# Patient Record
Sex: MALE | Race: WHITE | Employment: FULL TIME | URBAN - METROPOLITAN AREA
[De-identification: names, ages, dates, MRNs, and addresses within clinical notes are randomized per-mention and may not be internally consistent; named-entity substitution may affect disease eponyms.]

---

## 2017-09-15 ENCOUNTER — OFFICE VISIT (OUTPATIENT)
Dept: FAMILY MEDICINE | Facility: CLINIC | Age: 47
End: 2017-09-15

## 2017-09-15 ENCOUNTER — TELEPHONE (OUTPATIENT)
Dept: FAMILY MEDICINE | Facility: CLINIC | Age: 47
End: 2017-09-15

## 2017-09-15 VITALS
SYSTOLIC BLOOD PRESSURE: 136 MMHG | BODY MASS INDEX: 29.98 KG/M2 | WEIGHT: 202.4 LBS | HEIGHT: 69 IN | HEART RATE: 72 BPM | RESPIRATION RATE: 20 BRPM | TEMPERATURE: 98 F | DIASTOLIC BLOOD PRESSURE: 82 MMHG

## 2017-09-15 DIAGNOSIS — N52.9 ERECTILE DYSFUNCTION, UNSPECIFIED ERECTILE DYSFUNCTION TYPE: ICD-10-CM

## 2017-09-15 PROCEDURE — 99213 OFFICE O/P EST LOW 20 MIN: CPT | Performed by: PHYSICIAN ASSISTANT

## 2017-09-15 RX ORDER — SILDENAFIL 100 MG/1
50-100 TABLET, FILM COATED ORAL DAILY PRN
Qty: 12 TABLET | Refills: 11 | Status: CANCELLED | OUTPATIENT
Start: 2017-09-15

## 2017-09-15 RX ORDER — TADALAFIL 20 MG/1
20 TABLET ORAL DAILY PRN
Qty: 12 TABLET | Refills: 3 | Status: SHIPPED | OUTPATIENT
Start: 2017-09-15

## 2017-09-15 RX ORDER — VARDENAFIL HYDROCHLORIDE 20 MG/1
TABLET ORAL
Qty: 30 TABLET | Refills: 3 | Status: SHIPPED | OUTPATIENT
Start: 2017-09-15 | End: 2017-09-16

## 2017-09-15 NOTE — TELEPHONE ENCOUNTER
Simeon Toussaint is requesting a refill of:    Pending Prescriptions:                       Disp   Refills    vardenafil (LEVITRA) 20 MG tablet         24 tab*1            Sig: Take 1 tablet by mouth. Take as needed    Need to mail to his house. Please call him and see if that is ok.  [facility]  [phone] (home)

## 2017-09-15 NOTE — PROGRESS NOTES
"CC: Medication Refill    History:  Is here today for Levitra refill. Levitra 20 mg daily works well for him. Tried lower doses, without as good of effects. Has not tried any other medication for this. No side effects to medication. Pays almost $2000 for 30 tablets.      PMH, MEDICATIONS, ALLERGIES, SOCIAL AND FAMILY HISTORY in [facility] and reviewed by me personally.      ROS negative other than the symptoms noted above in the HPI.        Examination   /82 (BP Location: Right arm, Patient Position: Chair, Cuff Size: Adult Regular)  Pulse 72  Temp 98  F (36.7  C) (Oral)  Resp 20  Ht 1.753 m (5' 9\")  Wt 91.8 kg (202 lb 6.4 oz)  BMI 29.89 kg/m2       Constitutional: Sitting comfortably, in no acute distress. Vital signs noted  Cardiovascular:  regular rate and rhythm, no murmurs, clicks, or gallops  Respiratory:  normal respiratory rate and rhythm, lungs clear to auscultation  SKIN: No jaundice/pallor/rash.   Psychiatric: mentation appears normal and affect normal/bright        A/P    ICD-10-CM    1. Erectile dysfunction, unspecified erectile dysfunction type N52.9 vardenafil (LEVITRA) 20 MG tablet     tadalafil (CIALIS) 20 MG tablet       DISCUSSION: Discussed options for ED treatment with Ben. Will refill Levitra as this has worked for him, but also encouraged him to try trial of Cialis as it appears that may be a cheaper option. Discussed side effects and how to best take medication. He will contact me with any questions or concerns.    follow up visit: As needed    Nuris Funez PA-C  [facility]    "

## 2017-09-15 NOTE — NURSING NOTE
Pt called the clinic stating that one of his prescriptions never made it over to the Pharmacy.   Called over the second prescription the Levitra with the refills.     Lizz.TOM Gonzales ([facility])

## 2017-09-15 NOTE — MR AVS SNAPSHOT
After Visit Summary   9/15/2017    Simeon Toussaint    MRN: [id number]           Patient Information     Date Of Birth          1970        Visit Information        Provider Department      9/15/2017 9:30 AM Nuris Funez PA-C [facility]        Today's Diagnoses     Erectile dysfunction, unspecified erectile dysfunction type           Follow-ups after your visit        Follow-up notes from your care team     Return in about 1 year (around 9/15/2018) for Routine Visit.      Who to contact     If you have questions or need follow up information about today's clinic visit or your schedule please contact [facility] directly at [phone].  Normal or non-critical lab and imaging results will be communicated to you by [employer], letter or phone within 4 business days after the clinic has received the results. If you do not hear from us within 7 days, please contact the clinic through [employer] or phone. If you have a critical or abnormal lab result, we will notify you by phone as soon as possible.  Submit refill requests through [employer] or call your pharmacy and they will forward the refill request to us. Please allow 3 business days for your refill to be completed.          Additional Information About Your Visit        MyChart Information     [employer] gives you secure access to your electronic health record. If you see a primary care provider, you can also send messages to your care team and make appointments. If you have questions, please call your primary care clinic.  If you do not have a primary care provider, please call [phone] and they will assist you.        Care EveryWhere ID     This is your Care EveryWhere ID. This could be used by other organizations to access your [facility] records  [id number]        Your Vitals Were     Pulse Temperature Respirations Height BMI (Body Mass Index)       72 98  F (36.7  C) (Oral) 20  "1.753 m (5' 9\") 29.89 kg/m2        Blood Pressure from Last 3 Encounters:   09/15/17 136/82   10/16/15 114/74   04/24/15 138/72    Weight from Last 3 Encounters:   09/15/17 91.8 kg (202 lb 6.4 oz)   10/16/15 90.3 kg (199 lb)   04/24/15 89.2 kg (196 lb 9.6 oz)              Today, you had the following     No orders found for display         Today's Medication Changes          These changes are accurate as of: 9/15/17  4:29 PM.  If you have any questions, ask your nurse or doctor.               Start taking these medicines.        Dose/Directions    tadalafil 20 MG tablet   Commonly known as:  CIALIS   Used for:  Erectile dysfunction, unspecified erectile dysfunction type   Started by:  Nuris Funez PA-C        Dose:  20 mg   Take 1 tablet (20 mg) by mouth daily as needed Never use with nitroglycerin, terazosin or doxazosin.   Quantity:  12 tablet   Refills:  3            Where to get your medicines      These medications were sent to [facility] [id number] [place] [address] - [address] AT [facility]  [address] [facility], [place] [address]     Phone:  [phone]     tadalafil 20 MG tablet    vardenafil 20 MG tablet                Primary Care Provider Office Phone # Fax #    Mark Mo [phone] [address] [address]        Equal Access to Services     GAVINO TERRAZAS AH: Hadii aad ku hadasho Soomaali, waaxda luqadaha, qaybta kaalmada adeegyada, waxay mel parikh. So [facility] [phone].    ATENCIÓN: Si habla español, tiene a morrow disposición servicios gratuitos de asistencia lingüística. Llame al [phone].    We comply with applicable federal civil rights laws and [place] laws. We do not discriminate on the basis of race, color, national origin, age, disability sex, sexual orientation or gender identity.            Thank you!     Thank you for choosing [facility]  for your care. Our goal " is always to provide you with excellent care. Hearing back from our patients is one way we can continue to improve our services. Please take a few minutes to complete the written survey that you may receive in the mail after your visit with us. Thank you!             Your Updated Medication List - Protect others around you: Learn how to safely use, store and throw away your medicines at www.disposemymeds.org.          This list is accurate as of: 9/15/17  4:29 PM.  Always use your most recent med list.                   Brand Name Dispense Instructions for use Diagnosis    tadalafil 20 MG tablet    CIALIS    12 tablet    Take 1 tablet (20 mg) by mouth daily as needed Never use with nitroglycerin, terazosin or doxazosin.    Erectile dysfunction, unspecified erectile dysfunction type       vardenafil 20 MG tablet    LEVITRA    30 tablet    Take 1 tablet by mouth. Take as needed    Erectile dysfunction, unspecified erectile dysfunction type

## 2017-09-15 NOTE — NURSING NOTE
Simeon Toussaint is here for a medication check and refill.  Questioned patient about current smoking habits.  Pt. has never smoked.  PULSE regular  My Chart: active  CLASSIFICATION OF OVERWEIGHT AND OBESITY BY BMI                        Obesity Class           BMI(kg/m2)  Underweight                                    < 18.5  Normal                                         18.5-24.9  Overweight                                     25.0-29.9  OBESITY                     I                  30.0-34.9                             II                 35.0-39.9  EXTREME OBESITY             III                >40                            Patient's  BMI Body mass index is 29.89 kg/(m^2).  http://hin.nhlbi.nih.gov/menuplanner/menu.cgi  Pre-visit planning  Immunizations - up to date

## 2017-09-16 RX ORDER — VARDENAFIL HYDROCHLORIDE 20 MG/1
20 TABLET ORAL DAILY PRN
Qty: 24 TABLET | Refills: 3 | Status: SHIPPED | OUTPATIENT
Start: 2017-09-16 | End: 2017-09-16

## 2017-09-16 RX ORDER — VARDENAFIL HYDROCHLORIDE 20 MG/1
20 TABLET ORAL DAILY PRN
Qty: 24 TABLET | Refills: 3 | Status: SHIPPED | OUTPATIENT
Start: 2017-09-16

## 2017-09-16 RX ORDER — VARDENAFIL HYDROCHLORIDE 20 MG/1
TABLET ORAL
Qty: 24 TABLET | Refills: 1 | Status: CANCELLED | OUTPATIENT
Start: 2017-09-16

## 2017-09-16 NOTE — TELEPHONE ENCOUNTER
I have attempted to call the Pt at the number that was left here in his chart, but a woman answers and states that there is no Christopher here.   I have called twice to make sure I got the number correct.     I called on his cell phone and left a message and asked him to call me back at the clinic and let  Me know if he wanted me to mail his prescription to his house.     Lizz.TOM Gonzales ([facility])

## 2017-09-16 NOTE — TELEPHONE ENCOUNTER
Per Sakshi's note, please confirm this is okay with pt, and then send rx via mail to him. Thanks.

## 2017-09-16 NOTE — TELEPHONE ENCOUNTER
Simeon Called back and he does want the prescription mailed.   Placed in an envelope to be mailed.     Lizz.TOM Gonzales ([facility])

## 2018-01-29 ENCOUNTER — E-VISIT (OUTPATIENT)
Dept: FAMILY MEDICINE | Facility: CLINIC | Age: 48
End: 2018-01-29

## 2018-01-29 DIAGNOSIS — G47.00 INSOMNIA, UNSPECIFIED TYPE: Primary | ICD-10-CM

## 2019-01-03 ENCOUNTER — OFFICE VISIT (OUTPATIENT)
Dept: FAMILY MEDICINE | Facility: CLINIC | Age: 49
End: 2019-01-03

## 2019-01-03 VITALS
HEART RATE: 61 BPM | BODY MASS INDEX: 29.83 KG/M2 | TEMPERATURE: 98.4 F | SYSTOLIC BLOOD PRESSURE: 130 MMHG | HEIGHT: 68 IN | OXYGEN SATURATION: 98 % | WEIGHT: 196.8 LBS | DIASTOLIC BLOOD PRESSURE: 78 MMHG

## 2019-01-03 DIAGNOSIS — Z71.84 ENCOUNTER FOR COUNSELING FOR TRAVEL: Primary | ICD-10-CM

## 2019-01-03 DIAGNOSIS — A09 TRAVELER'S DIARRHEA: ICD-10-CM

## 2019-01-03 DIAGNOSIS — G47.00 INSOMNIA, UNSPECIFIED TYPE: ICD-10-CM

## 2019-01-03 DIAGNOSIS — Z23 NEED FOR IMMUNIZATION AGAINST TYPHOID: ICD-10-CM

## 2019-01-03 DIAGNOSIS — Z29.89 NEED FOR MALARIA PROPHYLAXIS: ICD-10-CM

## 2019-01-03 PROCEDURE — 99213 OFFICE O/P EST LOW 20 MIN: CPT | Performed by: PHYSICIAN ASSISTANT

## 2019-01-03 RX ORDER — ZOLPIDEM TARTRATE 10 MG/1
TABLET ORAL
Qty: 4 TABLET | Refills: 0 | Status: SHIPPED | OUTPATIENT
Start: 2019-01-03

## 2019-01-03 RX ORDER — LOPERAMIDE HYDROCHLORIDE 2 MG/1
2 TABLET ORAL 4 TIMES DAILY PRN
Qty: 30 TABLET | Refills: 0 | Status: SHIPPED | OUTPATIENT
Start: 2019-01-03 | End: 2019-01-13

## 2019-01-03 RX ORDER — ATOVAQUONE AND PROGUANIL HYDROCHLORIDE 250; 100 MG/1; MG/1
1 TABLET, FILM COATED ORAL DAILY
Qty: 14 TABLET | Refills: 0 | Status: SHIPPED | OUTPATIENT
Start: 2019-01-03 | End: 2019-01-17

## 2019-01-03 RX ORDER — CIPROFLOXACIN 500 MG/1
500 TABLET, FILM COATED ORAL 2 TIMES DAILY
Qty: 6 TABLET | Refills: 0 | Status: SHIPPED | OUTPATIENT
Start: 2019-01-03 | End: 2019-01-06

## 2019-01-03 ASSESSMENT — MIFFLIN-ST. JEOR: SCORE: 1731.15

## 2019-01-03 NOTE — PROGRESS NOTES
"CC: Travel consult- [place]    History:  Simeon is here today requesting several medication to enable his trip to [place].     Traveling to [place] 1/14/2019, arrives on 1/15/2019, and returns 1/18/2019.     Has been told by his company he will need typhoid vaccination, malaria prophylaxis, and traveler's diarrhea medication just in case.     Simeon also feels like he would benefit from a small quantity of Ambien to take on the flight. Has taken Ambien 10 mg before a flight and was able to sleep when he generally can't sleep on planes.     Overall, Simeon is feeling well, and feels fit to travel.     PMH, MEDICATIONS, ALLERGIES, SOCIAL AND FAMILY HISTORY in [facility] and reviewed by me personally.      ROS negative other than the symptoms noted above in the HPI.        Examination   /78 (BP Location: Right arm, Patient Position: Sitting, Cuff Size: Adult Large)   Pulse 61   Temp 98.4  F (36.9  C) (Oral)   Ht 1.734 m (5' 8.25\")   Wt 89.3 kg (196 lb 12.8 oz)   SpO2 98%   BMI 29.70 kg/m         Constitutional: Sitting comfortably, in no acute distress. Vital signs noted  Neck:  no adenopathy, trachea midline and normal to palpation  Cardiovascular:  regular rate and rhythm, no murmurs, clicks, or gallops  Respiratory:  normal respiratory rate and rhythm, lungs clear to auscultation  Psychiatric: mentation appears normal and affect normal/bright        A/P    ICD-10-CM    1. Encounter for counseling for travel Z71.89 loperamide (IMODIUM A-D) 2 MG tablet     zolpidem (AMBIEN) 10 MG tablet     typhoid (VIVOTIF) CR capsule     ciprofloxacin (CIPRO) 500 MG tablet     atovaquone-proguanil (MALARONE) 250-100 MG tablet   2. Need for malaria prophylaxis Z29.8 atovaquone-proguanil (MALARONE) 250-100 MG tablet   3. Need for immunization against typhoid Z23 typhoid (VIVOTIF) CR capsule   4. Traveler's diarrhea A09 loperamide (IMODIUM A-D) 2 MG tablet     ciprofloxacin (CIPRO) 500 MG tablet   5. Insomnia, " "unspecified type G47.00 zolpidem (AMBIEN) 10 MG tablet       DISCUSSION:  Reviewed CDC recommendations for travel to [place]. Explained that cholera vaccination is not available here. Would like him to complete Typhoid vaccination. Will also prescribe Malarone for him to take for malaria prophylaxis. Instructed on use, and possible side effects.     For PRN medications, did write script for 3 day course of ciprofloxacin for traveler's diarrhea, as well as prescription strength imodium. He has taken ciprofloxacin in the past without difficulty, but warned of side effects, including risk of tendon injury. Take with food.     Finally, did prescribe 4 tablets of Ambien 10 mg for him to use when flying. Warned him of both short and long term side effects, but pt is willing to accept risk. No driving while taking. Did briefly discuss a \"benzo\" as an alternative, but pt has never tried this for this reason before so he is unsure if they would work, and afraid to risk them not working.     follow up visit: As needed    Nuris Funez PA-C  [facility]    "

## 2019-01-03 NOTE — NURSING NOTE
Simeon is here today to discuss his upcoming trip to [facility] and any meds he may need.    Pre-visit Screening:  Immunizations:  up to date  Colonoscopy:  is not up to date  Mammogram: CALEB  Asthma Action Test/Plan:  CALEB  PHQ9:  PHQ 2 done today  GAD7:  NA  Questioned patient about current smoking habits Pt. has never smoked.  Ok to leave detailed message on voice mail for today's visit only Yes, phone # [phone]

## 2019-12-15 ENCOUNTER — HEALTH MAINTENANCE LETTER (OUTPATIENT)
Age: 49
End: 2019-12-15

## 2020-03-22 ENCOUNTER — HEALTH MAINTENANCE LETTER (OUTPATIENT)
Age: 50
End: 2020-03-22

## 2021-01-15 ENCOUNTER — HEALTH MAINTENANCE LETTER (OUTPATIENT)
Age: 51
End: 2021-01-15

## 2021-10-24 ENCOUNTER — HEALTH MAINTENANCE LETTER (OUTPATIENT)
Age: 51
End: 2021-10-24

## 2022-02-13 ENCOUNTER — HEALTH MAINTENANCE LETTER (OUTPATIENT)
Age: 52
End: 2022-02-13

## 2022-10-16 ENCOUNTER — HEALTH MAINTENANCE LETTER (OUTPATIENT)
Age: 52
End: 2022-10-16

## 2023-03-26 ENCOUNTER — HEALTH MAINTENANCE LETTER (OUTPATIENT)
Age: 53
End: 2023-03-26

## 2025-06-05 ENCOUNTER — NURSE TRIAGE (OUTPATIENT)
Dept: FAMILY MEDICINE | Facility: CLINIC | Age: 55
End: 2025-06-05

## 2025-06-05 ENCOUNTER — HOSPITAL ENCOUNTER (EMERGENCY)
Facility: CLINIC | Age: 55
Discharge: HOME | End: 2025-06-05
Attending: EMERGENCY MEDICINE
Payer: COMMERCIAL

## 2025-06-05 VITALS
HEIGHT: 69 IN | TEMPERATURE: 97 F | HEART RATE: 60 BPM | SYSTOLIC BLOOD PRESSURE: 161 MMHG | RESPIRATION RATE: 18 BRPM | WEIGHT: 207.67 LBS | OXYGEN SATURATION: 96 % | BODY MASS INDEX: 30.76 KG/M2 | DIASTOLIC BLOOD PRESSURE: 101 MMHG

## 2025-06-05 DIAGNOSIS — F10.129 ALCOHOL ABUSE WITH INTOXICATION: ICD-10-CM

## 2025-06-05 DIAGNOSIS — I10 HYPERTENSION, UNSPECIFIED TYPE: ICD-10-CM

## 2025-06-05 LAB
ALBUMIN SERPL BCG-MCNC: 4.6 G/DL (ref 3.5–5.2)
ALP SERPL-CCNC: 79 U/L (ref 40–150)
ALT SERPL W P-5'-P-CCNC: 109 U/L (ref 0–70)
ANION GAP SERPL CALCULATED.3IONS-SCNC: 17 MMOL/L (ref 7–15)
AST SERPL W P-5'-P-CCNC: 126 U/L (ref 0–45)
ATRIAL RATE - MUSE: 65 BPM
BASOPHILS # BLD AUTO: 0.1 10E3/UL (ref 0–0.2)
BASOPHILS NFR BLD AUTO: 2 %
BILIRUB SERPL-MCNC: 0.9 MG/DL
BUN SERPL-MCNC: 4.6 MG/DL (ref 6–20)
CALCIUM SERPL-MCNC: 9 MG/DL (ref 8.8–10.4)
CHLORIDE SERPL-SCNC: 89 MMOL/L (ref 98–107)
CREAT SERPL-MCNC: 0.75 MG/DL (ref 0.67–1.17)
DIASTOLIC BLOOD PRESSURE - MUSE: NORMAL MMHG
EGFRCR SERPLBLD CKD-EPI 2021: >90 ML/MIN/1.73M2
EOSINOPHIL # BLD AUTO: 0.1 10E3/UL (ref 0–0.7)
EOSINOPHIL NFR BLD AUTO: 4 %
ERYTHROCYTE [DISTWIDTH] IN BLOOD BY AUTOMATED COUNT: 14.3 % (ref 10–15)
ETHANOL SERPL-MCNC: 0.25 G/DL
GLUCOSE SERPL-MCNC: 97 MG/DL (ref 70–99)
HCO3 SERPL-SCNC: 24 MMOL/L (ref 22–29)
HCT VFR BLD AUTO: 45.2 % (ref 40–53)
HGB BLD-MCNC: 16.6 G/DL (ref 13.3–17.7)
HOLD SPECIMEN: NORMAL
HOLD SPECIMEN: NORMAL
IMM GRANULOCYTES # BLD: 0 10E3/UL
IMM GRANULOCYTES NFR BLD: 0 %
INTERPRETATION ECG - MUSE: NORMAL
LIPASE SERPL-CCNC: 49 U/L (ref 13–60)
LYMPHOCYTES # BLD AUTO: 0.9 10E3/UL (ref 0.8–5.3)
LYMPHOCYTES NFR BLD AUTO: 27 %
MAGNESIUM SERPL-MCNC: 1.8 MG/DL (ref 1.7–2.3)
MCH RBC QN AUTO: 32.7 PG (ref 26.5–33)
MCHC RBC AUTO-ENTMCNC: 36.7 G/DL (ref 31.5–36.5)
MCV RBC AUTO: 89 FL (ref 78–100)
MONOCYTES # BLD AUTO: 0.6 10E3/UL (ref 0–1.3)
MONOCYTES NFR BLD AUTO: 17 %
NEUTROPHILS # BLD AUTO: 1.8 10E3/UL (ref 1.6–8.3)
NEUTROPHILS NFR BLD AUTO: 50 %
NRBC # BLD AUTO: 0 10E3/UL
NRBC BLD AUTO-RTO: 0 /100
P AXIS - MUSE: 51 DEGREES
PLATELET # BLD AUTO: 75 10E3/UL (ref 150–450)
POTASSIUM SERPL-SCNC: 4 MMOL/L (ref 3.4–5.3)
PR INTERVAL - MUSE: 216 MS
PROT SERPL-MCNC: 7.8 G/DL (ref 6.4–8.3)
QRS DURATION - MUSE: 88 MS
QT - MUSE: 414 MS
QTC - MUSE: 430 MS
R AXIS - MUSE: 21 DEGREES
RBC # BLD AUTO: 5.07 10E6/UL (ref 4.4–5.9)
SODIUM SERPL-SCNC: 130 MMOL/L (ref 135–145)
SYSTOLIC BLOOD PRESSURE - MUSE: NORMAL MMHG
T AXIS - MUSE: 84 DEGREES
VENTRICULAR RATE- MUSE: 65 BPM
WBC # BLD AUTO: 3.5 10E3/UL (ref 4–11)

## 2025-06-05 PROCEDURE — 82077 ASSAY SPEC XCP UR&BREATH IA: CPT | Performed by: EMERGENCY MEDICINE

## 2025-06-05 PROCEDURE — 96361 HYDRATE IV INFUSION ADD-ON: CPT | Performed by: EMERGENCY MEDICINE

## 2025-06-05 PROCEDURE — 82040 ASSAY OF SERUM ALBUMIN: CPT | Performed by: EMERGENCY MEDICINE

## 2025-06-05 PROCEDURE — 96374 THER/PROPH/DIAG INJ IV PUSH: CPT | Performed by: EMERGENCY MEDICINE

## 2025-06-05 PROCEDURE — 83735 ASSAY OF MAGNESIUM: CPT | Performed by: EMERGENCY MEDICINE

## 2025-06-05 PROCEDURE — 96375 TX/PRO/DX INJ NEW DRUG ADDON: CPT | Performed by: EMERGENCY MEDICINE

## 2025-06-05 PROCEDURE — 99284 EMERGENCY DEPT VISIT MOD MDM: CPT | Mod: 25 | Performed by: EMERGENCY MEDICINE

## 2025-06-05 PROCEDURE — 85025 COMPLETE CBC W/AUTO DIFF WBC: CPT | Performed by: EMERGENCY MEDICINE

## 2025-06-05 PROCEDURE — 258N000003 HC RX IP 258 OP 636: Performed by: EMERGENCY MEDICINE

## 2025-06-05 PROCEDURE — 250N000011 HC RX IP 250 OP 636: Performed by: EMERGENCY MEDICINE

## 2025-06-05 PROCEDURE — 36415 COLL VENOUS BLD VENIPUNCTURE: CPT | Performed by: EMERGENCY MEDICINE

## 2025-06-05 PROCEDURE — 93005 ELECTROCARDIOGRAM TRACING: CPT | Performed by: EMERGENCY MEDICINE

## 2025-06-05 PROCEDURE — 83690 ASSAY OF LIPASE: CPT | Performed by: EMERGENCY MEDICINE

## 2025-06-05 RX ORDER — CLONIDINE HYDROCHLORIDE 0.2 MG/1
0.2 TABLET ORAL 3 TIMES DAILY PRN
Qty: 15 TABLET | Refills: 0 | Status: SHIPPED | OUTPATIENT
Start: 2025-06-05

## 2025-06-05 RX ORDER — ONDANSETRON 2 MG/ML
4 INJECTION INTRAMUSCULAR; INTRAVENOUS ONCE
Status: COMPLETED | OUTPATIENT
Start: 2025-06-05 | End: 2025-06-05

## 2025-06-05 RX ORDER — ONDANSETRON 4 MG/1
4 TABLET, ORALLY DISINTEGRATING ORAL EVERY 8 HOURS PRN
Qty: 15 TABLET | Refills: 0 | Status: SHIPPED | OUTPATIENT
Start: 2025-06-05 | End: 2025-06-08

## 2025-06-05 RX ORDER — LORAZEPAM 2 MG/ML
1 INJECTION INTRAMUSCULAR ONCE
Status: COMPLETED | OUTPATIENT
Start: 2025-06-05 | End: 2025-06-05

## 2025-06-05 RX ADMIN — LORAZEPAM 1 MG: 2 INJECTION INTRAMUSCULAR; INTRAVENOUS at 11:45

## 2025-06-05 RX ADMIN — ONDANSETRON 4 MG: 2 INJECTION, SOLUTION INTRAMUSCULAR; INTRAVENOUS at 11:41

## 2025-06-05 RX ADMIN — SODIUM CHLORIDE 1000 ML: 0.9 INJECTION, SOLUTION INTRAVENOUS at 10:57

## 2025-06-05 ASSESSMENT — COLUMBIA-SUICIDE SEVERITY RATING SCALE - C-SSRS
4. HAVE YOU HAD THESE THOUGHTS AND HAD SOME INTENTION OF ACTING ON THEM?: NO
2. HAVE YOU ACTUALLY HAD ANY THOUGHTS OF KILLING YOURSELF IN THE PAST MONTH?: YES
1. IN THE PAST MONTH, HAVE YOU WISHED YOU WERE DEAD OR WISHED YOU COULD GO TO SLEEP AND NOT WAKE UP?: YES
5. HAVE YOU STARTED TO WORK OUT OR WORKED OUT THE DETAILS OF HOW TO KILL YOURSELF? DO YOU INTEND TO CARRY OUT THIS PLAN?: NO
3. HAVE YOU BEEN THINKING ABOUT HOW YOU MIGHT KILL YOURSELF?: NO
6. HAVE YOU EVER DONE ANYTHING, STARTED TO DO ANYTHING, OR PREPARED TO DO ANYTHING TO END YOUR LIFE?: NO

## 2025-06-05 ASSESSMENT — ACTIVITIES OF DAILY LIVING (ADL)
ADLS_ACUITY_SCORE: 41
ADLS_ACUITY_SCORE: 41

## 2025-06-05 NOTE — ED PROVIDER NOTES
"  Emergency Department Note      History of Present Illness     Chief Complaint   Hypertension      HPI   Simeon Toussaint is a 54 year old male with a history of alcohol use who presents to the ED with his wife for evaluation of hypertension. Patient reports noticing his blood pressure was high this morning, 150-170's systolic, per wife. He states \"I tried to bring the blood pressure down with beer\", and has consumed 6 beers this morning. He states being under increased stress and has been drinking approximately 12 beers per day. He has gone through withdrawal in the past and denies history of alcoholic withdrawal seizures. He is unsure if his blood pressure was high the previous days and did not take his blood pressure until this morning. He is not on blood pressure medication. He started taking doxycyline for a tick bite yesterday, developed some nausea and had approximately 10 episodes of vomiting. Notes having some diarrhea \"from drinking beer\". Endorses some head pressure as well. He had nausea this morning which is now almost resolved. Denies chest pain/tightness/pressure, or urinary symptoms. Approximately 3 weeks ago he stopped taking Buspar and Prozac, and states that he does not like taking pills.     Independent Historian   Wife as detailed above.    Review of External Notes   I reviewed his nurse triage line from earlier today.    Past Medical History     Medical History and Problem List   ACP  Dermatophytosis of groin and perianal area     Medications   Cialis  Typhoid  Levitra   Ambien    Surgical History   The patient has no pertinent past surgical history.     Physical Exam     Patient Vitals for the past 24 hrs:   BP Temp Temp src Pulse Resp SpO2 Height Weight   06/05/25 1233 (!) 161/101 -- -- 60 -- 96 % -- --   06/05/25 1200 (!) 152/99 -- -- 70 -- 93 % -- --   06/05/25 1145 (!) 163/100 -- -- -- -- 96 % -- --   06/05/25 1100 (!) 151/99 -- -- 69 18 98 % -- --   06/05/25 1025 (!) 181/106 97  F " "(36.1  C) Temporal 69 18 97 % 1.753 m (5' 9\") 94.2 kg (207 lb 10.8 oz)     Physical Exam  Constitutional: Vital signs reviewed as above  General: Alert. Anxious.   HEENT: Dry mucous membranes  Eyes: Conjunctiva normal.   Neck: Normal range of motion  Cardiovascular: Regular rate, Regular rhythm and normal heart sounds.  No MRG  Pulmonary/Chest: Effort normal and breath sounds normal. No respiratory distress. Patient has no wheezes. Patient has no rales.   Abdominal: Soft. Positive bowel sounds. No MRG.  Musculoskeletal/Extremities: Full ROM.  Endo: No pitting edema  Neurological: Alert, no focal deficits.  Skin: Skin is warm and dry.   Psychiatric: Pleasant      Diagnostics     Lab Results   Labs Ordered and Resulted from Time of ED Arrival to Time of ED Departure   COMPREHENSIVE METABOLIC PANEL - Abnormal       Result Value    Sodium 130 (*)     Potassium 4.0      Carbon Dioxide (CO2) 24      Anion Gap 17 (*)     Urea Nitrogen 4.6 (*)     Creatinine 0.75      GFR Estimate >90      Calcium 9.0      Chloride 89 (*)     Glucose 97      Alkaline Phosphatase 79       (*)      (*)     Protein Total 7.8      Albumin 4.6      Bilirubin Total 0.9     ETHANOL LEVEL BLOOD - Abnormal    Ethanol Level Blood 0.25 (*)    CBC WITH PLATELETS AND DIFFERENTIAL - Abnormal    WBC Count 3.5 (*)     RBC Count 5.07      Hemoglobin 16.6      Hematocrit 45.2      MCV 89      MCH 32.7      MCHC 36.7 (*)     RDW 14.3      Platelet Count 75 (*)     % Neutrophils 50      % Lymphocytes 27      % Monocytes 17      % Eosinophils 4      % Basophils 2      % Immature Granulocytes 0      NRBCs per 100 WBC 0      Absolute Neutrophils 1.8      Absolute Lymphocytes 0.9      Absolute Monocytes 0.6      Absolute Eosinophils 0.1      Absolute Basophils 0.1      Absolute Immature Granulocytes 0.0      Absolute NRBCs 0.0     LIPASE - Normal    Lipase 49     MAGNESIUM - Normal    Magnesium 1.8         Imaging   No orders to display       EKG "   ECG results from 06/05/25   EKG 12-lead, tracing only     Value    Systolic Blood Pressure     Diastolic Blood Pressure     Ventricular Rate 65    Atrial Rate 65    AZ Interval 216    QRS Duration 88        QTc 430    P Axis 51    R AXIS 21    T Axis 84    Interpretation ECG      Sinus rhythm with 1st degree A-V block  Otherwise normal ECG  No previous ECGs available  Read by me at 1058       Independent Interpretation   None    ED Course      Medications Administered   Medications   sodium chloride 0.9% BOLUS 1,000 mL (0 mLs Intravenous Stopped 6/5/25 1157)   LORazepam (ATIVAN) injection 1 mg (1 mg Intravenous $Given 6/5/25 1145)   ondansetron (ZOFRAN) injection 4 mg (4 mg Intravenous $Given 6/5/25 1141)       Procedures   Procedures     Discussion of Management   None    ED Course   ED Course as of 06/05/25 1247   Thu Jun 05, 2025   1037 I obtained history and examined the patient as noted above.    1240 I discussed discharge instructions with the patient, patient is ok with this.        Additional Documentation  None    Medical Decision Making / Diagnosis     CMS Diagnoses: None    MIPS   None         [facility]   Simeon Toussaint is a 54 year old male who presents emergency department concerns over hypertension.  He noted it was high this morning so he decided to drink 5-6 beers to see if it would get better.  He does admit that he has an alcohol problem.  He usually drinks about 12 beers a day.  He says he has periods of sobriety and understands that he needs to make a change.  He recently had a tick exposure and took 2 doxycycline tablets.  It was shortly after that he had nausea and vomiting.  Part of that is because he was taking antibiotics and drinking severe amounts of alcohol.  Initial blood pressure here was 181/106.  His systolic blood pressures after that were in the low 160s to mid 150s.  His diastolic does remain 100.  He was given 1 mg of Ativan here as well as fluids.  His blood alcohol is  0.25.  He does have some minor electrolyte abnormalities and elevation of his AST and ALT likely secondary to chronic alcohol use.  EKG did not show any ischemic changes.  He was feeling better.  I strongly encouraged him to discontinue alcohol use.  He states has never had a withdrawal seizure and prefers to go home.  He is not interested in detox at this time.  He will journal his blood pressures same time every day and record it and follow-up with his primary care doctor.  Both he and his wife understand the plan and he will be discharged home.    Disposition   The patient was discharged.     Diagnosis     ICD-10-CM    1. Hypertension, unspecified type  I10       2. Alcohol abuse with intoxication  F10.129            Discharge Medications   New Prescriptions    CLONIDINE (CATAPRES) 0.2 MG TABLET    Take 1 tablet (0.2 mg) by mouth 3 times daily as needed (Witrhdrawal symptoms).    ONDANSETRON (ZOFRAN ODT) 4 MG ODT TAB    Take 1 tablet (4 mg) by mouth every 8 hours as needed for nausea.         Scribe Disclosure:  Violet WILLINGHAM, am serving as a scribe at 10:58 AM on 6/5/2025 to document services personally performed by Jorge Ward MD based on my observations and the provider's statements to me.        Jorge Ward MD  06/05/25 7227

## 2025-06-05 NOTE — ED TRIAGE NOTES
"Pt comes in with elevated BP.  Per wife BP went from SBP of 150's to 170's at home. Pt states \"I tried to bring the BP down with beer\", he admits to drinking 5-6 beers this morning.  He states that he has had a lot of increased stressors at home and has been drinking about 12 beers per day.  He is not interested in treatment at this time.    He believes that the BP is elevated due to either a tick bite for which he was started on doxycycline that caused GI upset or due to coming off of psych meds (prozac and buspar).      He admits to some thoughts of suicide when he was on prozac but states those have been gone since he stopped the med     Triage Assessment (Adult)       Row Name 06/05/25 1027          Triage Assessment    Airway WDL WDL        Respiratory WDL    Respiratory WDL WDL        Cardiac WDL    Cardiac WDL WDL                     "

## 2025-06-05 NOTE — TELEPHONE ENCOUNTER
"RN took call from patients partner, Keznie. Patient is present and gives verbal consent to communicate.     Kenzie reports Patients face was very red this morning so she took his blood pressure this morning and notes it was elevated.   Blood pressure: 164/98, 5 minutes ago  This morning 154/98  -Patient denies chest pain, difficulty breathing    Patient endorses vomiting, nausea, lack of appetite/unable to eat, lightheadedness, pressure in head and upper body  -Patient states \"It feels like you're going up in an air plane\", Patient feels pressure in head and upper body     -Vomitin episodes in last 24 hours, yellow in color. Denies blood in vomit. Reports it is stomach acid.     Denies taking blood pressure medication  -Patient discontinued prozac three weeks ago    Patient was seen yesterday for tick bite via [facility]   -Patient prescribed doxycycline     Patient drinks at least 12 beers per day.     -Patient has not been seen by [facility] since 2019, urgent care does not open until 10 am.     RN advised patient go to ED now. Kenzie verbalized understanding and agreeable to plan. Kenzie will drive patient to [facility] ED now.       VIRA Gutierrez, RN  [facility]    Reason for Disposition   Patient sounds very sick or weak to the triager    Additional Information   Negative: Sounds like a life-threatening emergency to the triager   Negative: Pregnant > 20 weeks or postpartum (< 6 weeks after delivery) and new hand or face swelling   Negative: Pregnant > 20 weeks and BP > 140/90   Negative: Systolic BP >= 160 OR Diastolic >= 100, and any cardiac or neurologic symptoms (e.g., chest pain, difficulty breathing, unsteady gait, blurred vision)    Protocols used: High Blood Pressure-A-OH    "

## 2025-06-06 ENCOUNTER — HOSPITAL ENCOUNTER (EMERGENCY)
Facility: CLINIC | Age: 55
Discharge: OTHER INSTITUTION | End: 2025-06-06
Attending: EMERGENCY MEDICINE | Admitting: EMERGENCY MEDICINE
Payer: COMMERCIAL

## 2025-06-06 ENCOUNTER — HOSPITAL ENCOUNTER (INPATIENT)
Facility: CLINIC | Age: 55
LOS: 2 days | Discharge: HOME | End: 2025-06-08
Attending: PSYCHIATRY & NEUROLOGY | Admitting: PSYCHIATRY & NEUROLOGY
Payer: COMMERCIAL

## 2025-06-06 VITALS
OXYGEN SATURATION: 94 % | HEIGHT: 69 IN | RESPIRATION RATE: 13 BRPM | DIASTOLIC BLOOD PRESSURE: 96 MMHG | SYSTOLIC BLOOD PRESSURE: 140 MMHG | WEIGHT: 206.13 LBS | BODY MASS INDEX: 30.53 KG/M2 | HEART RATE: 68 BPM | TEMPERATURE: 96.9 F

## 2025-06-06 DIAGNOSIS — F10.939 ALCOHOL WITHDRAWAL SYNDROME WITH COMPLICATION (H): Primary | ICD-10-CM

## 2025-06-06 DIAGNOSIS — E87.1 HYPONATREMIA: ICD-10-CM

## 2025-06-06 DIAGNOSIS — F10.930 ALCOHOL WITHDRAWAL, UNCOMPLICATED (H): ICD-10-CM

## 2025-06-06 LAB
ALBUMIN SERPL BCG-MCNC: 4.8 G/DL (ref 3.5–5.2)
ALP SERPL-CCNC: 78 U/L (ref 40–150)
ALT SERPL W P-5'-P-CCNC: 99 U/L (ref 0–70)
AMPHETAMINES UR QL SCN: NORMAL
ANION GAP SERPL CALCULATED.3IONS-SCNC: 15 MMOL/L (ref 7–15)
AST SERPL W P-5'-P-CCNC: 85 U/L (ref 0–45)
ATRIAL RATE - MUSE: 65 BPM
BARBITURATES UR QL SCN: NORMAL
BASOPHILS # BLD AUTO: 0 10E3/UL (ref 0–0.2)
BASOPHILS NFR BLD AUTO: 1 %
BENZODIAZ UR QL SCN: NORMAL
BILIRUB DIRECT SERPL-MCNC: 0.53 MG/DL (ref 0–0.3)
BILIRUB SERPL-MCNC: 1.7 MG/DL
BUN SERPL-MCNC: 5.1 MG/DL (ref 6–20)
BZE UR QL SCN: NORMAL
CALCIUM SERPL-MCNC: 9.6 MG/DL (ref 8.8–10.4)
CANNABINOIDS UR QL SCN: NORMAL
CHLORIDE SERPL-SCNC: 88 MMOL/L (ref 98–107)
CREAT SERPL-MCNC: 0.91 MG/DL (ref 0.67–1.17)
DIASTOLIC BLOOD PRESSURE - MUSE: NORMAL MMHG
EGFRCR SERPLBLD CKD-EPI 2021: >90 ML/MIN/1.73M2
EOSINOPHIL # BLD AUTO: 0.2 10E3/UL (ref 0–0.7)
EOSINOPHIL NFR BLD AUTO: 4 %
ERYTHROCYTE [DISTWIDTH] IN BLOOD BY AUTOMATED COUNT: 14.1 % (ref 10–15)
EST. AVERAGE GLUCOSE BLD GHB EST-MCNC: 105 MG/DL
ETHANOL SERPL-MCNC: <0.01 G/DL
FENTANYL UR QL: NORMAL
GGT SERPL-CCNC: 185 U/L (ref 8–61)
GLUCOSE SERPL-MCNC: 94 MG/DL (ref 70–99)
HBA1C MFR BLD: 5.3 %
HCO3 SERPL-SCNC: 23 MMOL/L (ref 22–29)
HCT VFR BLD AUTO: 43.8 % (ref 40–53)
HGB BLD-MCNC: 16.2 G/DL (ref 13.3–17.7)
HOLD SPECIMEN: NORMAL
IMM GRANULOCYTES # BLD: 0 10E3/UL
IMM GRANULOCYTES NFR BLD: 0 %
INTERPRETATION ECG - MUSE: NORMAL
LIPASE SERPL-CCNC: 43 U/L (ref 13–60)
LYMPHOCYTES # BLD AUTO: 0.9 10E3/UL (ref 0.8–5.3)
LYMPHOCYTES NFR BLD AUTO: 18 %
MAGNESIUM SERPL-MCNC: 1.8 MG/DL (ref 1.7–2.3)
MCH RBC QN AUTO: 33.2 PG (ref 26.5–33)
MCHC RBC AUTO-ENTMCNC: 37 G/DL (ref 31.5–36.5)
MCV RBC AUTO: 90 FL (ref 78–100)
MONOCYTES # BLD AUTO: 0.7 10E3/UL (ref 0–1.3)
MONOCYTES NFR BLD AUTO: 15 %
NEUTROPHILS # BLD AUTO: 3.1 10E3/UL (ref 1.6–8.3)
NEUTROPHILS NFR BLD AUTO: 62 %
NRBC # BLD AUTO: 0 10E3/UL
NRBC BLD AUTO-RTO: 0 /100
OPIATES UR QL SCN: NORMAL
P AXIS - MUSE: 51 DEGREES
PCP QUAL URINE (ROCHE): NORMAL
PHOSPHATE SERPL-MCNC: 3.1 MG/DL (ref 2.5–4.5)
PLATELET # BLD AUTO: 112 10E3/UL (ref 150–450)
POTASSIUM SERPL-SCNC: 4.4 MMOL/L (ref 3.4–5.3)
PR INTERVAL - MUSE: 216 MS
PROT SERPL-MCNC: 7.7 G/DL (ref 6.4–8.3)
QRS DURATION - MUSE: 88 MS
QT - MUSE: 414 MS
QTC - MUSE: 430 MS
R AXIS - MUSE: 21 DEGREES
RBC # BLD AUTO: 4.88 10E6/UL (ref 4.4–5.9)
SODIUM SERPL-SCNC: 126 MMOL/L (ref 135–145)
SODIUM SERPL-SCNC: 126 MMOL/L (ref 135–145)
SODIUM SERPL-SCNC: 128 MMOL/L (ref 135–145)
SODIUM SERPL-SCNC: 130 MMOL/L (ref 135–145)
SYSTOLIC BLOOD PRESSURE - MUSE: NORMAL MMHG
T AXIS - MUSE: 84 DEGREES
TSH SERPL DL<=0.005 MIU/L-ACNC: 2.33 UIU/ML (ref 0.3–4.2)
VENTRICULAR RATE- MUSE: 65 BPM
WBC # BLD AUTO: 4.9 10E3/UL (ref 4–11)

## 2025-06-06 PROCEDURE — 99207 PR NO CHARGE LOS: CPT | Performed by: PSYCHIATRY & NEUROLOGY

## 2025-06-06 PROCEDURE — 250N000013 HC RX MED GY IP 250 OP 250 PS 637: Performed by: EMERGENCY MEDICINE

## 2025-06-06 PROCEDURE — 80307 DRUG TEST PRSMV CHEM ANLYZR: CPT | Performed by: EMERGENCY MEDICINE

## 2025-06-06 PROCEDURE — 36415 COLL VENOUS BLD VENIPUNCTURE: CPT | Performed by: PSYCHIATRY & NEUROLOGY

## 2025-06-06 PROCEDURE — 36415 COLL VENOUS BLD VENIPUNCTURE: CPT | Performed by: EMERGENCY MEDICINE

## 2025-06-06 PROCEDURE — 82435 ASSAY OF BLOOD CHLORIDE: CPT | Performed by: EMERGENCY MEDICINE

## 2025-06-06 PROCEDURE — 250N000013 HC RX MED GY IP 250 OP 250 PS 637: Performed by: PSYCHIATRY & NEUROLOGY

## 2025-06-06 PROCEDURE — 96361 HYDRATE IV INFUSION ADD-ON: CPT

## 2025-06-06 PROCEDURE — 84295 ASSAY OF SERUM SODIUM: CPT | Performed by: EMERGENCY MEDICINE

## 2025-06-06 PROCEDURE — 84443 ASSAY THYROID STIM HORMONE: CPT | Performed by: PSYCHIATRY & NEUROLOGY

## 2025-06-06 PROCEDURE — 80047 BASIC METABLC PNL IONIZED CA: CPT

## 2025-06-06 PROCEDURE — 82077 ASSAY SPEC XCP UR&BREATH IA: CPT | Performed by: EMERGENCY MEDICINE

## 2025-06-06 PROCEDURE — 84100 ASSAY OF PHOSPHORUS: CPT | Performed by: EMERGENCY MEDICINE

## 2025-06-06 PROCEDURE — 96374 THER/PROPH/DIAG INJ IV PUSH: CPT

## 2025-06-06 PROCEDURE — 250N000011 HC RX IP 250 OP 636: Performed by: EMERGENCY MEDICINE

## 2025-06-06 PROCEDURE — 128N000004 HC R&B CD ADULT

## 2025-06-06 PROCEDURE — 83735 ASSAY OF MAGNESIUM: CPT | Performed by: EMERGENCY MEDICINE

## 2025-06-06 PROCEDURE — 82977 ASSAY OF GGT: CPT | Performed by: PSYCHIATRY & NEUROLOGY

## 2025-06-06 PROCEDURE — 85025 COMPLETE CBC W/AUTO DIFF WBC: CPT | Performed by: EMERGENCY MEDICINE

## 2025-06-06 PROCEDURE — 83690 ASSAY OF LIPASE: CPT | Performed by: EMERGENCY MEDICINE

## 2025-06-06 PROCEDURE — 99285 EMERGENCY DEPT VISIT HI MDM: CPT | Mod: 25

## 2025-06-06 PROCEDURE — 82040 ASSAY OF SERUM ALBUMIN: CPT | Performed by: EMERGENCY MEDICINE

## 2025-06-06 PROCEDURE — 258N000003 HC RX IP 258 OP 636: Performed by: EMERGENCY MEDICINE

## 2025-06-06 PROCEDURE — 83036 HEMOGLOBIN GLYCOSYLATED A1C: CPT | Performed by: PSYCHIATRY & NEUROLOGY

## 2025-06-06 RX ORDER — DIAZEPAM 5 MG/1
5-20 TABLET ORAL EVERY 30 MIN PRN
Status: DISCONTINUED | OUTPATIENT
Start: 2025-06-06 | End: 2025-06-08 | Stop reason: HOSPADM

## 2025-06-06 RX ORDER — OLANZAPINE 10 MG/2ML
10 INJECTION, POWDER, FOR SOLUTION INTRAMUSCULAR 3 TIMES DAILY PRN
Status: DISCONTINUED | OUTPATIENT
Start: 2025-06-06 | End: 2025-06-08 | Stop reason: HOSPADM

## 2025-06-06 RX ORDER — ACETAMINOPHEN 325 MG/1
650 TABLET ORAL EVERY 4 HOURS PRN
Status: DISCONTINUED | OUTPATIENT
Start: 2025-06-06 | End: 2025-06-08 | Stop reason: HOSPADM

## 2025-06-06 RX ORDER — MAGNESIUM HYDROXIDE/ALUMINUM HYDROXICE/SIMETHICONE 120; 1200; 1200 MG/30ML; MG/30ML; MG/30ML
30 SUSPENSION ORAL EVERY 4 HOURS PRN
Status: DISCONTINUED | OUTPATIENT
Start: 2025-06-06 | End: 2025-06-08 | Stop reason: HOSPADM

## 2025-06-06 RX ORDER — HYDROXYZINE HYDROCHLORIDE 25 MG/1
25 TABLET, FILM COATED ORAL EVERY 4 HOURS PRN
Status: DISCONTINUED | OUTPATIENT
Start: 2025-06-06 | End: 2025-06-08 | Stop reason: HOSPADM

## 2025-06-06 RX ORDER — DIAZEPAM 5 MG/1
10 TABLET ORAL ONCE
Status: COMPLETED | OUTPATIENT
Start: 2025-06-06 | End: 2025-06-06

## 2025-06-06 RX ORDER — LOPERAMIDE HYDROCHLORIDE 2 MG/1
2 CAPSULE ORAL 4 TIMES DAILY PRN
Status: DISCONTINUED | OUTPATIENT
Start: 2025-06-06 | End: 2025-06-08 | Stop reason: HOSPADM

## 2025-06-06 RX ORDER — ONDANSETRON 4 MG/1
4 TABLET, ORALLY DISINTEGRATING ORAL EVERY 6 HOURS PRN
Status: DISCONTINUED | OUTPATIENT
Start: 2025-06-06 | End: 2025-06-08 | Stop reason: HOSPADM

## 2025-06-06 RX ORDER — DIAZEPAM 10 MG/2ML
5 INJECTION, SOLUTION INTRAMUSCULAR; INTRAVENOUS ONCE
Status: DISCONTINUED | OUTPATIENT
Start: 2025-06-06 | End: 2025-06-06

## 2025-06-06 RX ORDER — FOLIC ACID 1 MG/1
1 TABLET ORAL DAILY
Status: DISCONTINUED | OUTPATIENT
Start: 2025-06-07 | End: 2025-06-08 | Stop reason: HOSPADM

## 2025-06-06 RX ORDER — FLUMAZENIL 0.1 MG/ML
0.2 INJECTION, SOLUTION INTRAVENOUS
Status: DISCONTINUED | OUTPATIENT
Start: 2025-06-06 | End: 2025-06-06 | Stop reason: HOSPADM

## 2025-06-06 RX ORDER — OLANZAPINE 10 MG/1
10 TABLET, FILM COATED ORAL 3 TIMES DAILY PRN
Status: DISCONTINUED | OUTPATIENT
Start: 2025-06-06 | End: 2025-06-08 | Stop reason: HOSPADM

## 2025-06-06 RX ORDER — DIAZEPAM 5 MG/1
10 TABLET ORAL EVERY 30 MIN PRN
Status: DISCONTINUED | OUTPATIENT
Start: 2025-06-06 | End: 2025-06-06 | Stop reason: HOSPADM

## 2025-06-06 RX ORDER — DIAZEPAM 10 MG/2ML
5-10 INJECTION, SOLUTION INTRAMUSCULAR; INTRAVENOUS EVERY 30 MIN PRN
Status: DISCONTINUED | OUTPATIENT
Start: 2025-06-06 | End: 2025-06-06 | Stop reason: HOSPADM

## 2025-06-06 RX ORDER — MULTIPLE VITAMINS W/ MINERALS TAB 9MG-400MCG
1 TAB ORAL DAILY
Status: DISCONTINUED | OUTPATIENT
Start: 2025-06-07 | End: 2025-06-08 | Stop reason: HOSPADM

## 2025-06-06 RX ORDER — ATENOLOL 50 MG/1
50 TABLET ORAL DAILY PRN
Status: DISCONTINUED | OUTPATIENT
Start: 2025-06-06 | End: 2025-06-08 | Stop reason: HOSPADM

## 2025-06-06 RX ORDER — DIAZEPAM 10 MG/2ML
5 INJECTION, SOLUTION INTRAMUSCULAR; INTRAVENOUS ONCE
Status: COMPLETED | OUTPATIENT
Start: 2025-06-06 | End: 2025-06-06

## 2025-06-06 RX ORDER — TRAZODONE HYDROCHLORIDE 50 MG/1
50 TABLET ORAL
Status: DISCONTINUED | OUTPATIENT
Start: 2025-06-06 | End: 2025-06-08 | Stop reason: HOSPADM

## 2025-06-06 RX ORDER — ONDANSETRON 2 MG/ML
4 INJECTION INTRAMUSCULAR; INTRAVENOUS EVERY 30 MIN PRN
Status: DISCONTINUED | OUTPATIENT
Start: 2025-06-06 | End: 2025-06-06 | Stop reason: HOSPADM

## 2025-06-06 RX ORDER — MULTIPLE VITAMINS W/ MINERALS TAB 9MG-400MCG
1 TAB ORAL DAILY
Status: DISCONTINUED | OUTPATIENT
Start: 2025-06-06 | End: 2025-06-06 | Stop reason: HOSPADM

## 2025-06-06 RX ORDER — FOLIC ACID 1 MG/1
1 TABLET ORAL DAILY
Status: DISCONTINUED | OUTPATIENT
Start: 2025-06-06 | End: 2025-06-06 | Stop reason: HOSPADM

## 2025-06-06 RX ORDER — GABAPENTIN 600 MG/1
1200 TABLET ORAL ONCE
Status: COMPLETED | OUTPATIENT
Start: 2025-06-06 | End: 2025-06-06

## 2025-06-06 RX ORDER — SODIUM CHLORIDE 1 G/1
1 TABLET ORAL ONCE
Status: COMPLETED | OUTPATIENT
Start: 2025-06-06 | End: 2025-06-06

## 2025-06-06 RX ORDER — AMOXICILLIN 250 MG
1 CAPSULE ORAL 2 TIMES DAILY PRN
Status: DISCONTINUED | OUTPATIENT
Start: 2025-06-06 | End: 2025-06-08 | Stop reason: HOSPADM

## 2025-06-06 RX ADMIN — SODIUM CHLORIDE 1000 ML: 0.9 INJECTION, SOLUTION INTRAVENOUS at 12:25

## 2025-06-06 RX ADMIN — HYDROXYZINE HYDROCHLORIDE 25 MG: 25 TABLET, FILM COATED ORAL at 21:41

## 2025-06-06 RX ADMIN — DIAZEPAM 10 MG: 5 TABLET ORAL at 17:05

## 2025-06-06 RX ADMIN — DIAZEPAM 5 MG: 5 INJECTION INTRAMUSCULAR; INTRAVENOUS at 10:05

## 2025-06-06 RX ADMIN — DIAZEPAM 10 MG: 5 TABLET ORAL at 13:14

## 2025-06-06 RX ADMIN — GABAPENTIN 1200 MG: 600 TABLET, FILM COATED ORAL at 10:03

## 2025-06-06 RX ADMIN — DIAZEPAM 10 MG: 5 TABLET ORAL at 11:06

## 2025-06-06 RX ADMIN — SODIUM CHLORIDE 1000 ML: 0.9 INJECTION, SOLUTION INTRAVENOUS at 10:03

## 2025-06-06 RX ADMIN — Medication 1 TABLET: at 10:03

## 2025-06-06 RX ADMIN — FOLIC ACID 1 MG: 1 TABLET ORAL at 10:03

## 2025-06-06 RX ADMIN — THIAMINE HCL TAB 100 MG 100 MG: 100 TAB at 10:03

## 2025-06-06 RX ADMIN — Medication 1 G: at 14:52

## 2025-06-06 RX ADMIN — DIAZEPAM 10 MG: 5 TABLET ORAL at 21:42

## 2025-06-06 ASSESSMENT — LIFESTYLE VARIABLES
NAUSEA AND VOMITING: MILD NAUSEA WITH NO VOMITING
ANXIETY: 5
PAROXYSMAL SWEATS: BARELY PERCEPTIBLE SWEATING, PALMS MOIST
TREMOR: MODERATE, WITH PATIENT'S ARMS EXTENDED
VISUAL DISTURBANCES: NOT PRESENT
PAROXYSMAL SWEATS: BARELY PERCEPTIBLE SWEATING, PALMS MOIST
VISUAL DISTURBANCES: NOT PRESENT
ORIENTATION AND CLOUDING OF SENSORIUM: ORIENTED AND CAN DO SERIAL ADDITIONS
TREMOR: 6
HEADACHE, FULLNESS IN HEAD: NOT PRESENT
TOTAL SCORE: 6
VISUAL DISTURBANCES: NOT PRESENT
TREMOR: MODERATE, WITH PATIENT'S ARMS EXTENDED
TOTAL SCORE: 11
NAUSEA AND VOMITING: NO NAUSEA AND NO VOMITING
NAUSEA AND VOMITING: NO NAUSEA AND NO VOMITING
AGITATION: NORMAL ACTIVITY
ANXIETY: MILDLY ANXIOUS
AUDITORY DISTURBANCES: NOT PRESENT
AGITATION: NORMAL ACTIVITY
AUDITORY DISTURBANCES: NOT PRESENT
NAUSEA AND VOMITING: MILD NAUSEA WITH NO VOMITING
AGITATION: NORMAL ACTIVITY
ORIENTATION AND CLOUDING OF SENSORIUM: ORIENTED AND CAN DO SERIAL ADDITIONS
AGITATION: NORMAL ACTIVITY
ANXIETY: 3
ORIENTATION AND CLOUDING OF SENSORIUM: ORIENTED AND CAN DO SERIAL ADDITIONS
AUDITORY DISTURBANCES: NOT PRESENT
VISUAL DISTURBANCES: NOT PRESENT
TREMOR: MODERATE, WITH PATIENT'S ARMS EXTENDED
HEADACHE, FULLNESS IN HEAD: NOT PRESENT
ANXIETY: MILDLY ANXIOUS
HEADACHE, FULLNESS IN HEAD: NOT PRESENT
PAROXYSMAL SWEATS: BARELY PERCEPTIBLE SWEATING, PALMS MOIST
AUDITORY DISTURBANCES: NOT PRESENT
ORIENTATION AND CLOUDING OF SENSORIUM: ORIENTED AND CAN DO SERIAL ADDITIONS
PAROXYSMAL SWEATS: BARELY PERCEPTIBLE SWEATING, PALMS MOIST
TOTAL SCORE: 11
TOTAL SCORE: 6
HEADACHE, FULLNESS IN HEAD: NOT PRESENT

## 2025-06-06 ASSESSMENT — ACTIVITIES OF DAILY LIVING (ADL)
ADLS_ACUITY_SCORE: 15
ADLS_ACUITY_SCORE: 41
ADLS_ACUITY_SCORE: 15
ORAL_HYGIENE: INDEPENDENT
ADLS_ACUITY_SCORE: 41
ADLS_ACUITY_SCORE: 41
LAUNDRY: WITH SUPERVISION
ADLS_ACUITY_SCORE: 41
DRESS: INDEPENDENT
ADLS_ACUITY_SCORE: 41
HYGIENE/GROOMING: INDEPENDENT
ADLS_ACUITY_SCORE: 41

## 2025-06-06 ASSESSMENT — COLUMBIA-SUICIDE SEVERITY RATING SCALE - C-SSRS
2. HAVE YOU ACTUALLY HAD ANY THOUGHTS OF KILLING YOURSELF IN THE PAST MONTH?: NO
1. IN THE PAST MONTH, HAVE YOU WISHED YOU WERE DEAD OR WISHED YOU COULD GO TO SLEEP AND NOT WAKE UP?: NO
6. HAVE YOU EVER DONE ANYTHING, STARTED TO DO ANYTHING, OR PREPARED TO DO ANYTHING TO END YOUR LIFE?: NO

## 2025-06-06 NOTE — PLAN OF CARE
Goal Outcome Evaluation:       Holli from intake called about patient in queue,

## 2025-06-06 NOTE — CONSULTS
Brief Psychiatry Note   Chart and labs reviewed. Patient meets criteria for 3A detox admission to detox from alcohol. Orders for admission placed.     Most Recent 2 LFT's:  Recent Labs   Lab Test 06/06/25  0952 06/05/25  1054   AST 85* 126*   ALT 99* 109*   ALKPHOS 78 79   BILITOTAL 1.7* 0.9       Vernell Sandoval MD

## 2025-06-06 NOTE — ED PROVIDER NOTES
Emergency Department Note      History of Present Illness     Chief Complaint   Withdrawal      HPI   iSmeon Toussaint is a 54 year old male who presents to the ED for withdrawal. The patient states that he stopped drinking alcohol yesterday, and was seen in the ED for this yesterday morning. He had an IV placed and was prescribed nausea medication as well as withdrawal medication. He states that today, his withdrawal symptoms have persisted, specifically his shaking. He has also has nausea but no vomiting today. He did have vomiting previously, though he attributes this to a medication he was on for lyme disease. He adds that he had elevated BP yesterday, which has also persisted today. Patient reports that he has quit alcohol 3 times previously. The last time he relapsed was about 3 weeks ago after coming off of Prozac. He was drinking about 12 beers daily before stopping yesterday. Patient has no history of withdrawal seizures or confusion. He has not previously been hospitalized for alcohol related issues or been to detox treatment. His significant other states that he desires in patient treatment at this time. Patient denies any hematemesis, abdominal pain, chest pain, or shortness of breath. Denies any suicidal ideation or homicidal ideation. No recent falls or head injuries. He is not a smoker.    Independent Historian   Significant other as detailed above.    Review of External Notes   I reviewed nurse triage note from yesterday (6/5/25). Patient was seen for hypertension and alcohol abuse with intoxication.    Past Medical History     Medical History and Problem List   Dermatophytosis of groin and perianal area   Venereal wart    Medications   Catapres  Zofran  Cialis  Vivotif  Levitra  Ambien     Surgical History   Past Surgical History:   Procedure Laterality Date    HC TOOTH EXTRACTION W/FORCEP  age 21    wisdom       Physical Exam     Patient Vitals for the past 24 hrs:   BP Temp Temp src Pulse  "Resp SpO2 Height Weight   06/06/25 1059 (!) 133/95 -- -- 67 18 96 % -- --   06/06/25 1029 126/85 -- -- 64 13 94 % -- --   06/06/25 0959 (!) 150/89 -- -- 64 21 97 % -- --   06/06/25 0955 (!) 172/105 -- -- 66 -- -- -- --   06/06/25 0943 (!) 176/104 96.9  F (36.1  C) Temporal 70 18 98 % 1.753 m (5' 9\") 93.5 kg (206 lb 2.1 oz)     Physical Exam  Constitutional: Well developed, nontox appearance  Head: Atraumatic.   Mouth/Throat: Oropharynx is clear and tacky  Neck:  no stridor  Eyes: no scleral icterus  Cardiovascular: RRR, 2+ bilat radial pulses  Pulmonary/Chest: nml resp effort  Abdominal: ND, soft, NT, no rebound or guarding   Ext: Warm, well perfused, no edema  Neurological: A&O, tremulous, symmetric facies, moves ext x4  Skin: Skin is warm and dry.   Psychiatric: Behavior is normal. Thought content normal.   Nursing note and vitals reviewed.      Diagnostics     Lab Results   Labs Ordered and Resulted from Time of ED Arrival to Time of ED Departure   BASIC METABOLIC PANEL - Abnormal       Result Value    Sodium 126 (*)     Potassium 4.4      Chloride 88 (*)     Carbon Dioxide (CO2) 23      Anion Gap 15      Urea Nitrogen 5.1 (*)     Creatinine 0.91      GFR Estimate >90      Calcium 9.6      Glucose 94     HEPATIC FUNCTION PANEL - Abnormal    Protein Total 7.7      Albumin 4.8      Bilirubin Total 1.7 (*)     Alkaline Phosphatase 78      AST 85 (*)     ALT 99 (*)     Bilirubin Direct 0.53 (*)    CBC WITH PLATELETS AND DIFFERENTIAL - Abnormal    WBC Count 4.9      RBC Count 4.88      Hemoglobin 16.2      Hematocrit 43.8      MCV 90      MCH 33.2 (*)     MCHC 37.0 (*)     RDW 14.1      Platelet Count 112 (*)     % Neutrophils 62      % Lymphocytes 18      % Monocytes 15      % Eosinophils 4      % Basophils 1      % Immature Granulocytes 0      NRBCs per 100 WBC 0      Absolute Neutrophils 3.1      Absolute Lymphocytes 0.9      Absolute Monocytes 0.7      Absolute Eosinophils 0.2      Absolute Basophils 0.0      " Absolute Immature Granulocytes 0.0      Absolute NRBCs 0.0     SODIUM - Abnormal    Sodium 126 (*)    SODIUM - Abnormal    Sodium 128 (*)    LIPASE - Normal    Lipase 43     MAGNESIUM - Normal    Magnesium 1.8     ETHANOL LEVEL BLOOD - Normal    Ethanol Level Blood <0.01     PHOSPHORUS - Normal    Phosphorus 3.1     URINE DRUG SCREEN PANEL - Normal    Amphetamines Urine Screen Negative      Barbituates Urine Screen Negative      Benzodiazepine Urine Screen Negative      Cannabinoids Urine Screen Negative      Cocaine Urine Screen Negative      Fentanyl Qual Urine Screen Negative      Opiates Urine Screen Negative      PCP Urine Screen Negative         Imaging   No orders to display     Independent Interpretation   None    ED Course      Medications Administered   Medications   ondansetron (ZOFRAN) injection 4 mg (has no administration in time range)   flumazenil (ROMAZICON) injection 0.2 mg (has no administration in time range)   melatonin tablet 5 mg (has no administration in time range)   diazepam (VALIUM) tablet 10 mg (10 mg Oral $Given 6/6/25 1314)     Or   diazepam (VALIUM) injection 5-10 mg ( Intravenous See Alternative 6/6/25 1314)   multivitamin w/minerals (THERA-VIT-M) tablet 1 tablet (1 tablet Oral $Given 6/6/25 1003)   thiamine (B-1) tablet 100 mg (100 mg Oral $Given 6/6/25 1003)   folic acid (FOLVITE) tablet 1 mg (1 mg Oral $Given 6/6/25 1003)   sodium chloride tablet 1 g (has no administration in time range)   sodium chloride 0.9% BOLUS 1,000 mL (0 mLs Intravenous Stopped 6/6/25 1229)   diazepam (VALIUM) injection 5 mg (5 mg Intravenous $Given 6/6/25 1005)   gabapentin (NEURONTIN) tablet 1,200 mg (1,200 mg Oral $Given 6/6/25 1003)   sodium chloride 0.9% BOLUS 1,000 mL (0 mLs Intravenous Stopped 6/6/25 1352)   diazepam (VALIUM) tablet 10 mg (10 mg Oral $Given 6/6/25 1106)       Procedures   Procedures     Discussion of Management   None    ED Course   ED Course as of 06/06/25 1432   Fri Jun 06, 2025    1793 I obtained history and examined the patient as noted above.     1100 I rechecked the patient.   1139 I spoke with [facility] regarding patient's admission to their treatment program. They accepted patient.       Additional Documentation  Social Determinants of Health: Alcohol abuse    Medical Decision Making / Diagnosis     CMS Diagnoses: None    MIPS   None               [facility]   Simeon Toussaint is a 54 year old male presenting with tremulousness, concern for alcohol withdrawal    Differential diagnosis includes electrolyte abnormality, alcohol gastritis, alcohol withdrawal.  Patient denies history of seizures or DTs.  He is hypertensive in the emergency department and on physical exam he is tremulous consistent with alcohol withdrawal.  Labs ordered as noted above and significant for mild hyponatremia although the patient has no neurologic signs or altered level of consciousness to indicate profound hyponatremia.  Improvement with interventions as noted above.  Patient is agreeable to go to [facility] for further management.  For detox, patient is safe for transfer to their facility sodium is greater than or equal to 130.  Patient denies SI/HI.  No evidence of delirium on my evaluation.  Patient was counseled on results, diagnosis and disposition.  He is understanding agreeable plan.    Disposition   The patient was transferred to [facility] via EMS    Diagnosis     ICD-10-CM    1. Alcohol withdrawal, uncomplicated (H)  F10.930       2. Hyponatremia  E87.1            Discharge Medications   New Prescriptions    No medications on file         Scribe Disclosure:  I, Janeth Main, am serving as a scribe at 10:05 AM on 6/6/2025 to document services personally performed by Simeon Medina MD based on my observations and the provider's statements to me.        Simeon Medina MD  06/06/25 [id number]

## 2025-06-06 NOTE — ED TRIAGE NOTES
Pt arrives ambulatory into triage for alcohol withdrawal and high blood pressure. Seen yesterday and discharged. Feels worse today despite clonidine. Last drink yesterday. No hx of seizures. Pt is visibly tremulous in triage.

## 2025-06-06 NOTE — PROGRESS NOTES
Called Ann and received a report on the patient. She promised to call me to let me know when he has been picked up. She also promised to remove any IV and run the unit rules through him.

## 2025-06-06 NOTE — ED NOTES
Spoke to [place] they want pt sodium at 130 before transfer. Was told not to call report until then.   Unit [facility] report #[phone]

## 2025-06-07 LAB
ANION GAP SERPL CALCULATED.3IONS-SCNC: 12 MMOL/L (ref 7–15)
BUN SERPL-MCNC: 10.3 MG/DL (ref 6–20)
CALCIUM SERPL-MCNC: 9.9 MG/DL (ref 8.8–10.4)
CHLORIDE SERPL-SCNC: 96 MMOL/L (ref 98–107)
CREAT SERPL-MCNC: 1.07 MG/DL (ref 0.67–1.17)
EGFRCR SERPLBLD CKD-EPI 2021: 81 ML/MIN/1.73M2
GLUCOSE SERPL-MCNC: 68 MG/DL (ref 70–99)
HCO3 SERPL-SCNC: 25 MMOL/L (ref 22–29)
HOLD SPECIMEN: NORMAL
POTASSIUM SERPL-SCNC: 4.3 MMOL/L (ref 3.4–5.3)
SODIUM SERPL-SCNC: 133 MMOL/L (ref 135–145)

## 2025-06-07 PROCEDURE — 128N000004 HC R&B CD ADULT

## 2025-06-07 PROCEDURE — 80048 BASIC METABOLIC PNL TOTAL CA: CPT | Performed by: PHYSICIAN ASSISTANT

## 2025-06-07 PROCEDURE — 250N000013 HC RX MED GY IP 250 OP 250 PS 637: Performed by: PSYCHIATRY & NEUROLOGY

## 2025-06-07 PROCEDURE — 97150 GROUP THERAPEUTIC PROCEDURES: CPT | Mod: GO

## 2025-06-07 PROCEDURE — HZ2ZZZZ DETOXIFICATION SERVICES FOR SUBSTANCE ABUSE TREATMENT: ICD-10-PCS | Performed by: PSYCHIATRY & NEUROLOGY

## 2025-06-07 PROCEDURE — 36415 COLL VENOUS BLD VENIPUNCTURE: CPT | Performed by: PHYSICIAN ASSISTANT

## 2025-06-07 PROCEDURE — 99222 1ST HOSP IP/OBS MODERATE 55: CPT | Performed by: PSYCHIATRY & NEUROLOGY

## 2025-06-07 PROCEDURE — 99254 IP/OBS CNSLTJ NEW/EST MOD 60: CPT | Performed by: PHYSICIAN ASSISTANT

## 2025-06-07 RX ORDER — ONDANSETRON 4 MG/1
4 TABLET, FILM COATED ORAL EVERY 8 HOURS PRN
Status: ON HOLD | COMMUNITY
End: 2025-06-08

## 2025-06-07 RX ORDER — IODINE/SODIUM IODIDE 2 %
TINCTURE TOPICAL
Status: DISCONTINUED | OUTPATIENT
Start: 2025-06-07 | End: 2025-06-08 | Stop reason: HOSPADM

## 2025-06-07 RX ADMIN — DIAZEPAM 10 MG: 5 TABLET ORAL at 16:32

## 2025-06-07 RX ADMIN — THIAMINE HCL TAB 100 MG 100 MG: 100 TAB at 08:50

## 2025-06-07 RX ADMIN — HYDROXYZINE HYDROCHLORIDE 25 MG: 25 TABLET, FILM COATED ORAL at 08:50

## 2025-06-07 RX ADMIN — ATENOLOL 50 MG: 50 TABLET ORAL at 16:28

## 2025-06-07 RX ADMIN — FERRIC OXIDE RED: 8; 8 LOTION TOPICAL at 13:32

## 2025-06-07 RX ADMIN — FOLIC ACID 1 MG: 1 TABLET ORAL at 08:50

## 2025-06-07 RX ADMIN — HYDROXYZINE HYDROCHLORIDE 25 MG: 25 TABLET, FILM COATED ORAL at 16:28

## 2025-06-07 RX ADMIN — DIAZEPAM 10 MG: 5 TABLET ORAL at 08:50

## 2025-06-07 RX ADMIN — Medication 1 TABLET: at 08:50

## 2025-06-07 ASSESSMENT — ACTIVITIES OF DAILY LIVING (ADL)
ADLS_ACUITY_SCORE: 15
DRESS: INDEPENDENT
ADLS_ACUITY_SCORE: 15
ORAL_HYGIENE: INDEPENDENT
ADLS_ACUITY_SCORE: 15
ADLS_ACUITY_SCORE: 15
LAUNDRY: UNABLE TO COMPLETE
ADLS_ACUITY_SCORE: 15
HYGIENE/GROOMING: INDEPENDENT
ADLS_ACUITY_SCORE: 15

## 2025-06-07 NOTE — CONSULTS
[facility]  Consult Note - Hospitalist Service  Date of Admission:  6/6/2025  Consult Requested by: Dr. Wu  Reason for Consult: Medical co-management    Assessment & Plan   Simeon Toussaint is a 54 year old yo male with hx of etoh use disorder, admitted to  psychiatric detox unit (3A) after presenting to ED with etoh abuse and desire for medically supervised etoh withdrawal. Internal medicine consulted for medical co-management.     # Etoh use disorder and sz withdrawal ppx: Management per psychiatry primary team.     # Hyponatremia: Initial Na at . Improved to 130 yesterday. And again to 133 today. Likely hypovolemic from poor po fluid intake PTA other than etoh.   - Follow up with PCP after discharge    # Tick bite: Given 200 mg prophylactic dose of doxycycline for tick bite on his back.   - No further medical intervention indicated at this time.     # Elevated blood pressure: BPs currently 150s / low 100s likely due to etoh withdrawal and anxiety.  - Follow up with PCP in 1-2 weeks after discharge for hospital follow up and repeat BP check.   - Prn po hydralazine with parameters to give in interim.     # Transaminitis  # Mild hyperbilirubinemia  # Mild thrombocytopenia   AST and ALT improved on repeat labs. T bili only mildly elevated. Plt count improved as well. Likely due to his heavy etoh use PTA and expect to resolve and pt continues to detoxify and abstain from future etoh use.  - Follow up with PCP in 1-2 weeks after discharge for hospital follow up and repeat CBC and hepatic panel.        The patient's care was discussed with the Patient and communicated to primary team via this note. Medicine signing off. Please feel free to call with questions.      David Fontenot PA-C  Hospitalist Service  Securely message with [employer] (more info)  Text page via [facility] Paging/Directory    ______________________________________________________________________    Chief Complaint   Etoh withdrawal    History is obtained from the patient and electronic health record    History of Present Illness   Please refer to H&P dated 6/7/25 by Dr. Wu for full details. In brief, Simeon Toussaint is a 54 year old yo male with hx of etoh use disorder, admitted to  psychiatric detox unit (3A) after presenting to ED with etoh abuse and desire for medically supervised etoh withdrawal. Internal medicine consulted for medical co-management.    Currently pt admits to feeling better since admission. Still with some shakiness but denies fevers, chills, chest pain, SOB, nausea, abd pain, bowel and bladder concerns.        Past Medical History    No past medical history on file.    Past Surgical History   Past Surgical History:   Procedure Laterality Date    HC TOOTH EXTRACTION W/FORCEP  age 21    wisdom       Medications   Medications Prior to Admission   Medication Sig Dispense Refill Last Dose/Taking    cloNIDine (CATAPRES) 0.2 MG tablet Take 1 tablet (0.2 mg) by mouth 3 times daily as needed (Witrhdrawal symptoms). 15 tablet 0     ondansetron (ZOFRAN ODT) 4 MG ODT tab Take 1 tablet (4 mg) by mouth every 8 hours as needed for nausea. 15 tablet 0     tadalafil (CIALIS) 20 MG tablet Take 1 tablet (20 mg) by mouth daily as needed Never use with nitroglycerin, terazosin or doxazosin. 12 tablet 3     typhoid (VIVOTIF) CR capsule Take 1 capsule by mouth every other day 4 capsule 0     vardenafil (LEVITRA) 20 MG tablet Take 1 tablet (20 mg) by mouth daily as needed 60 min prior to sex. Do not use with nitroglycerin, terazosin or doxazosin. 24 tablet 3     zolpidem (AMBIEN) 10 MG tablet Take 1/2-1 tablet (5-10 mg) for sleep on flight 4 tablet 0           Review of Systems    The 10 point Review of Systems is negative other than noted in the HPI or here.     Social History   I have reviewed this patient's social history  and updated it with pertinent information if needed.  Social History     Tobacco Use    Smoking status: Never    Smokeless tobacco: Never   Substance Use Topics    Alcohol use: No     Alcohol/week: 0.8 standard drinks of alcohol     Types: 1 drink(s) per week     Comment: socially    Drug use: No         Family History   I have reviewed this patient's family history and updated it with pertinent information if needed.  Family History   Problem Relation Age of Onset    Family History Negative Unknown     C.A.D. Paternal Grandfather          MI in his 50's         Allergies   Allergies   Allergen Reactions    Sulfa Antibiotics         Physical Exam   Vital Signs: Temp: 97.1  F (36.2  C) Temp src: Temporal BP: (!) 152/104 Pulse: 79   Resp: 16 SpO2: 97 % O2 Device: None (Room air)    Weight: 195 lbs 0 oz  GEN: In NAD  HEENT: NCAT; PERRL; sclerae non-icteric  LUNGS: CTAB  CV: RRR  ABD: +BSs; SNTND  EXT: No BLE edema  SKIN: No acute rashes noted on exposed areas.  NEURO: AAOx3; CNs grossly intact; No acute focal deficits noted.        Medical Decision Making       40 MINUTES SPENT BY ME on the date of service doing chart review, history, exam, documentation & further activities per the note.      Data   CMP  Recent Labs   Lab 25  1549 25  1310 25  0952 25  1054   * 128* 126*  126* 130*   POTASSIUM  --   --  4.4 4.0   CHLORIDE  --   --  88* 89*   CO2  --   --  23 24   ANIONGAP  --   --  15 17*   GLC  --   --  94 97   BUN  --   --  5.1* 4.6*   CR  --   --  0.91 0.75   GFRESTIMATED  --   --  >90 >90   ABBY  --   --  9.6 9.0   MAG  --   --  1.8 1.8   PHOS  --   --  3.1  --    PROTTOTAL  --   --  7.7 7.8   ALBUMIN  --   --  4.8 4.6   BILITOTAL  --   --  1.7* 0.9   ALKPHOS  --   --  78 79   AST  --   --  85* 126*   ALT  --   --  99* 109*     CBC  Recent Labs   Lab 25  0952 25  1054   WBC 4.9 3.5*   RBC 4.88 5.07   HGB 16.2 16.6   HCT 43.8 45.2   MCV 90 89   MCH 33.2* 32.7   MCHC  37.0* 36.7*   RDW 14.1 14.3   * 75*     INRNo lab results found in last 7 days.  Arterial Blood GasNo lab results found in last 7 days.

## 2025-06-07 NOTE — PLAN OF CARE
Problem: Alcohol Withdrawal  Goal: Alcohol Withdrawal Symptom Control  Outcome: Progressing   Goal Outcome Evaluation:         Pt Alert and oriented, pleasant and cooperative with meds, Slightly hypertensive this AM, MSSA 8/4, c/o anxiety, Valium 10mg x1, hydroxyzine as needed with effect. BP trending down to 148/90.    Pt requesting if family can drop off a set off clothes,he would rather wear his personal clothes other than scrubs. Family to drop off no string clothes today or tomorrow, added on the visiting list for tomorrow afternoon .    Pt denies pain or psych symptoms. Calamine lotion applied to back for itching and rash, related to tick bite, pt showered and changed clothes.

## 2025-06-07 NOTE — PROGRESS NOTES
"[facility]     Daily Encounter: Met with team, discussed patient progress, discharge plan and any impediments to discharge.    Pt shares he has been struggling with alcohol abuse for a little over a year. He reports stress from caring for his father that is a hoarder and was recently moved to a group home due to being unable to care for himself has led him to increased use of alcohol. Pt shared he has attempted to quit drinking on his own 3 times in the past year the longest period was 4 months and thinking he could return to \"normal use\" led him to relapse. He shares his girlfriends drink occasional however has told him she will not drink at all in order to support him. Pt denies the need for [facility] treatment however may be interested in MAT. Writer suggested individual therapy to help him process stress and reported anxiety related to his carrying for his dad but he declined. Pt shares he does not have a PCP and writer will schedule him an appt if he is still her on Monday.   Insurance: BCBS/[employer] [place]      Legal Status:  Voluntary     SUDs Assessment Status: Not needed.      ROIs on file: None att his time.      Living Situation: Lives in his own home with girlfriend.      Current Providers, Supports & Collateral:  Girlfriend.     Current Plan/Referral Status: Detox only, maybe MAT     Safety Plan Status: discussed with pt, gave paperwork, will continue safety plan in a future session      HEATH Duarte  [facility] - Adult Inpatient Addiction Psychiatry Unit           "

## 2025-06-07 NOTE — DISCHARGE INSTRUCTIONS
Behavioral Discharge Planning and Instructions  THANK YOU FOR CHOOSING [address]  [phone]    Summary: You were admitted to [facility] on 06/06/2025 for detoxification from alcohol.  A medical exam was performed that included lab work. You have met with a [facility] Counselor and opted to return home.  Please take care and make your recovery a daily priority, Simeon!  It was a pleasure working with you and the entire treatment team here wishes you the very best in your recovery!     Recommendation:  Please abstain from the use of all mood altering chemicals.     Main Diagnoses:      Alcohol use disorder, severe  Alcohol withdrawal with unspecified complication   History of situational anxiety     Clinically Significant Risk Factors Present on Admission          # Hyponatremia: Lowest Na = 126 mmol/L in last 2 days, will monitor as appropriate  # Hypochloremia: Lowest Cl = 88 mmol/L in last 2 days, will monitor as appropriate           # Thrombocytopenia: Lowest platelets = 75 in last 2 days, will monitor for bleeding     # Hypertension: Home medication list includes antihypertensive(s)       Major Treatments, Procedures and Findings:  You were treated for Alcohol  detoxification using Valium.  You have met with a [facility] counselor to develop a treatment plan for discharge. You had labs drawn and those results were reviewed with you. Please take a copy of your lab work with you to your next primary care provider appointment.    Symptoms to Report:  If you experience more anxiety, confusion, sleeplessness, deep sadness or thoughts of suicide, notify your treatment team or notify your primary care provider. IF ANY OF THE SYMPTOMS YOU ARE EXPERIENCING ARE A MEDICAL EMERGENCY CALL 911 IMMEDIATELY.     Lifestyle Adjustment: Adjust your lifestyle to get enough sleep, relaxation, exercise and good nutrition. Continue to develop healthy coping skills to decrease stress and promote a sober living environment. Do  not use mood altering substances including alcohol, illegal drugs or addictive medications other than what is currently prescribed.     Disposition: Home    Facts about COVID19 at www.cdc.gov/COVID19 and www.[place].gov/covid19    Keeping hands clean is one of the most important steps we can take to avoid getting sick and spreading germs to others.  Please wash your hands frequently and lather with soap for at least 20 seconds!    Follow-up Appointment:   You are aware you should make a follow up appointment with your primary care doctor for medical and medication management     Recovery apps for your phone for educational purposes and to locate in person and zoom recovery meetings  Everything AA -  alexander is a great resource  12 Step Toolkit - educational purpose learning about the 12 steps to recovery  [place] Cloud - meeting alexander  AA  - meeting alexander  Meeting guide - meeting alexander  Quick NA meeting - meeting alexander  Isaak- has various apps    Patient Navigation Hub  [facility] Navigators work to be your point-of-contact for trustworthy and compassionate care from Inpatient services to [facility] Programmatic Care. We will provide resources and communication to help guide you into programmatic care. Ultimately, our goal is to be the one-stop-shop of communication, coordination, and support for your journey to programmatic care.  Phone: [phone]    Resources:  AA/NA meetings have returned to in-person or a hybrid combination of zoom/in-person therefore please check online to verify time.  Need Support Now? If you or someone you know is struggling or in crisis, help is available. Call or text 738 or chat 98pbsiline.org  AA meetings search for them at: https://aa-intergroup.org (worldwide meeting listings)  AA meetings for [place] area can be found online at: https://[place].org (click local online meetings listings)  NA meetings for [place] area can be found online at: https://www.nam[place].org  (click  "find a meeting)  AA and NA Sponsors are excellent resources for support and you can find one at any support group meeting.   Alcoholics Anonymous (https://aa.org/): for information 24 hours/day  AA Intergroup service office in [place] (http://www.aastpaul.org/) 506.772.6757  AA Intergroup service office in [facility]: 906.281.7276. (http://www.a[place].org/)  Narcotics Anonymous (www.nam[place].org) (885) 385-5340  https://aafairviewriverside.org/meetings  SMART Recovery - self management for addiction recovery:  www.smartrecovery.org  Pathways ~ A Health Crisis Resource & Support Center:  245.908.4832.  https://prescribetoprevent.org/patient-education/videos/  http://www.dVentus Technologiesreduction.org  Crisis Text Line  Text 149900  You will be connected with a trained live crisis counselor to provide support. Por espanol, texto  VIRI a 709967 o texto a 442-AYUDAME en WhatsApp  [facility] Hope Line  1.800.SUICIDE [0329434]  [facility] 190-143-0541  Support Group:  AA/NA and Sponsor/support.  Fast Tracker  Linking people to mental health and substance use disorder resources  [employer].org   [place] Mental Health Warm Line  Peer to peer support  Monday thru Saturday, 12 pm to 10 pm  774.727.4207 or 1.667.841.5055  Text \"Support\" to 45192  National [place] on Mental Illness (DENIZ)  169.683.6728 or 1.888.DENIZ.HELPS  Alcoholics Anonymous (www.alcoholics-anonymous.org): Check your phone book for your local chapter.  Suicide Awareness Voices of Education (SAVE) (www.save.org): 528-152-HFXJ (8438)  National Suicide Prevention Line (www.mentalhealthmn.org): 245-818-IYLK (1244)  Mental Health Consumer/Survivor Network of [place] (www.mhcsn.net): 207.350.2185 or 535-155-5894  Mental Health Association of [place] (www.mentalhealth.org): 757.639.8052 or 961-948-7485   Substance Abuse and Mental Health Services (www.sama.gov)  [place] Opioid Prevention Coalition: www.opioidcoalition.org    [place] " Recovery Connection ([facility])  [facility] connects people seeking recovery to resources that help foster and sustain long-term recovery.  Whether you are seeking resources for treatment, transportation, housing, job training, education, health care or other pathways to recovery, [facility] is a great place to start.  403.530.8891.  www.[facility].org    Great Pod casts for nutrition and wellness  Listen on Apple Podcasts  Dishing Up Nutrition   [employer]   Nutrition       Understand the connection between what you eat and how you feel. Hosted by licensed nutritionists and dietitians from [employer] we share practical, real-life solutions for healthier living through nutrition.     General Medication Instructions:   See your medication sheet(s) for instructions.   Take all medications as prescribed.  Make no changes unless your primary care provider suggests them.   Go to all your primary care provider visits.  Be sure to have all your required lab tests. This way, your medicines can be refilled on time.  Do not use any forms of alcohol.    Please Note: If you have any questions at anytime after you discharged please call [facility] detox unit [facility] at [phone].    Here are a list of additional numbers you can call if you are wanting to resume services through [facility]:  [facility] Assessment Intake: [phone]  [facility] Adult [facility] Intensive Outpatient  call: [phone]  [facility] Admissions [phone]    Recovery Clinic call: [phone]  [facility]: [phone]  Medical Records call: [phone]  Billing Department call: [phone]    Please remember to take all of your behavioral discharge planning and lab paperwork to any follow up appointments, it contains your lab results, diagnosis, medication list and discharge recommendations.      THANK YOU FOR CHOOSING [facility]

## 2025-06-07 NOTE — PLAN OF CARE
06/06/25 212   Patient Belongings   Did you bring any home meds/supplements to the hospital?  Yes   Disposition of meds  Other (see comment)   Patient Belongings other (see comments)   Belongings Search Yes   Clothing Search Yes   Second Staff Luis A     Goal Outcome Evaluation:         Storage Bin: Sweatshirt with string, belt, paper bag and plastic bags   Meed Room Bin Box: Cell phone, , smart Wach, eye glass, 2 wallets and  pen   Security env.# [id number]:  3 master cared, visa, [employer], [place] ID, cash $ 107 , sliver dollar in clip, 2 sliver dollars, 4 dollars in coins, one dollar in coin   A               Admission:  I am responsible for any personal items that are not sent to the safe or pharmacy.  [facility] is not responsible for loss, theft or damage of any property in my possession.    Signature:  _________________________________ Date: _______  Time: _____                                              Staff Signature:  ____________________________ Date: ________  Time: _____      2nd Staff person, if patient is unable/unwilling to sign:    Signature: ________________________________ Date: ________  Time: _____     Discharge:  [facility] has returned all of my personal belongings:    Signature: _________________________________ Date: ________  Time: _____                                          Staff Signature:  ____________________________ Date: ________  Time: _____

## 2025-06-07 NOTE — PHARMACY-ADMISSION MEDICATION HISTORY
Pharmacy Intern Admission Medication History    Admission medication history is complete. The information provided in this note is only as accurate as the sources available at the time of the update.    Information Source(s): Patient via in-person    Pertinent Information:   Simeon was unsure of the name of his anti-nausea medication, but dispense history indicates that he was prescribed ondansetron  reports only taking clonidine and ondansetron at home prior to admission, with last doses being the morning of admission  Explains that he was started on Buspar and fluoxetine recently, but stopped taking them about 3 weeks ago    Changes made to PTA medication list:  Added: None  Deleted:   Tadalafil 20mg tablet  Vivotif CR capsule  Vardenafil 20mg tablet  Zolpidem 10mg tablet  Changed: Ondansetron tablets, rather than ODT    Allergies reviewed with patient and updates made in EHR: yes    Medication History Completed By: Debbie Anthony 6/7/2025 6:33 PM    PTA Med List   Medication Sig Last Dose/Taking    cloNIDine (CATAPRES) 0.2 MG tablet Take 1 tablet (0.2 mg) by mouth 3 times daily as needed (Witrhdrawal symptoms). 6/6/2025 Morning    ondansetron (ZOFRAN) 4 MG tablet Take 4 mg by mouth every 8 hours as needed for nausea. 6/6/2025 Morning

## 2025-06-07 NOTE — H&P
"[facility], [place]   Psychiatric History and Physical  Admission date: 6/6/2025        Chief Complaint:   \"I feel pretty good.\"         HPI:     The patient is a 53yo male with a history of alcohol use disorder and anxiety who was admitted to detoxify from alcohol. He reports that he is feeling \"pretty good.\" Did get some sleep last night. Says that his nausea and vomiting have decreased and he was able to eat and drink some. Mood is good. Denies SI. Does report being on Prozac and Buspar in the past due to \"situational anxiety\" as his father was in a car accident and the patient had to go through his home (father is a hoarder) and put father in a group home. When he stopped his medications, he felt that this triggered a relapse. Also was diagnosed with Lyme disease recently. BP has been high.      Per ER:    Simeon Toussaint is a 54 year old male who presents to the ED for withdrawal. The patient states that he stopped drinking alcohol yesterday, and was seen in the ED for this yesterday morning. He had an IV placed and was prescribed nausea medication as well as withdrawal medication. He states that today, his withdrawal symptoms have persisted, specifically his shaking. He has also has nausea but no vomiting today. He did have vomiting previously, though he attributes this to a medication he was on for lyme disease. He adds that he had elevated BP yesterday, which has also persisted today. Patient reports that he has quit alcohol 3 times previously. The last time he relapsed was about 3 weeks ago after coming off of Prozac. He was drinking about 12 beers daily before stopping yesterday. Patient has no history of withdrawal seizures or confusion. He has not previously been hospitalized for alcohol related issues or been to detox treatment. His significant other states that he desires in patient treatment at this time. Patient denies any hematemesis, abdominal pain, chest pain, or " "shortness of breath. Denies any suicidal ideation or homicidal ideation. No recent falls or head injuries. He is not a smoker.         Past Psychiatric History:     History of \"situational anxiety\" where he was prescribed Prozac and Buspar. Records show he has been prescribed Ambien in the past, but the patient denies being on this.         Substance Use and History:     Alcohol use disorder. Denies a history of withdrawal seizures or DTs.         Past Medical History:   PAST MEDICAL HISTORY: No past medical history on file.    PAST SURGICAL HISTORY:   Past Surgical History:   Procedure Laterality Date    HC TOOTH EXTRACTION W/FORCEP  age 21    wisdom             Family History:   FAMILY HISTORY:   Family History   Problem Relation Age of Onset    Family History Negative Unknown     C.A.D. Paternal Grandfather          MI in his 50's           Social History:   Please see the full psychosocial profile from the clinical treatment coordinator.   SOCIAL HISTORY:   Social History     Tobacco Use    Smoking status: Never    Smokeless tobacco: Never   Substance Use Topics    Alcohol use: No     Alcohol/week: 0.8 standard drinks of alcohol     Types: 1 drink(s) per week     Comment: socially            Physical ROS:   The patient endorsed nausea. The remainder of 10-point review of systems was negative except as noted in HPI.         PTA Medications:     Medications Prior to Admission   Medication Sig Dispense Refill Last Dose/Taking    cloNIDine (CATAPRES) 0.2 MG tablet Take 1 tablet (0.2 mg) by mouth 3 times daily as needed (Witrhdrawal symptoms). 15 tablet 0     ondansetron (ZOFRAN ODT) 4 MG ODT tab Take 1 tablet (4 mg) by mouth every 8 hours as needed for nausea. 15 tablet 0     tadalafil (CIALIS) 20 MG tablet Take 1 tablet (20 mg) by mouth daily as needed Never use with nitroglycerin, terazosin or doxazosin. 12 tablet 3     typhoid (VIVOTIF) CR capsule Take 1 capsule by mouth every other day 4 capsule 0     " vardenafil (LEVITRA) 20 MG tablet Take 1 tablet (20 mg) by mouth daily as needed 60 min prior to sex. Do not use with nitroglycerin, terazosin or doxazosin. 24 tablet 3     zolpidem (AMBIEN) 10 MG tablet Take 1/2-1 tablet (5-10 mg) for sleep on flight 4 tablet 0           Allergies:     Allergies   Allergen Reactions    Sulfa Antibiotics           Labs:     Recent Results (from the past 48 hours)   EKG 12-lead, tracing only    Collection Time: 06/05/25 10:39 AM   Result Value Ref Range    Systolic Blood Pressure  mmHg    Diastolic Blood Pressure  mmHg    Ventricular Rate 65 BPM    Atrial Rate 65 BPM    KY Interval 216 ms    QRS Duration 88 ms     ms    QTc 430 ms    P Axis 51 degrees    R AXIS 21 degrees    T Axis 84 degrees    Interpretation ECG       Sinus rhythm with 1st degree A-V block  Otherwise normal ECG  No previous ECGs available  Unconfirmed report - interpretation of this ECG is computer generated - see medical record for final interpretation  Confirmed by - EMERGENCY ROOM, PHYSICIAN ([id number]),  Terry Terrell ([id number]) on 6/6/2025 7:38:41 AM     Comprehensive metabolic panel    Collection Time: 06/05/25 10:54 AM   Result Value Ref Range    Sodium 130 (L) 135 - 145 mmol/L    Potassium 4.0 3.4 - 5.3 mmol/L    Carbon Dioxide (CO2) 24 22 - 29 mmol/L    Anion Gap 17 (H) 7 - 15 mmol/L    Urea Nitrogen 4.6 (L) 6.0 - 20.0 mg/dL    Creatinine 0.75 0.67 - 1.17 mg/dL    GFR Estimate >90 >60 mL/min/1.73m2    Calcium 9.0 8.8 - 10.4 mg/dL    Chloride 89 (L) 98 - 107 mmol/L    Glucose 97 70 - 99 mg/dL    Alkaline Phosphatase 79 40 - 150 U/L     (H) 0 - 45 U/L     (H) 0 - 70 U/L    Protein Total 7.8 6.4 - 8.3 g/dL    Albumin 4.6 3.5 - 5.2 g/dL    Bilirubin Total 0.9 <=1.2 mg/dL   Lipase    Collection Time: 06/05/25 10:54 AM   Result Value Ref Range    Lipase 49 13 - 60 U/L   Magnesium    Collection Time: 06/05/25 10:54 AM   Result Value Ref Range    Magnesium 1.8 1.7 - 2.3 mg/dL   Ethanol Level  Blood    Collection Time: 06/05/25 10:54 AM   Result Value Ref Range    Ethanol Level Blood 0.25 (H) <=0.01 g/dL   CBC with platelets and differential    Collection Time: 06/05/25 10:54 AM   Result Value Ref Range    WBC Count 3.5 (L) 4.0 - 11.0 10e3/uL    RBC Count 5.07 4.40 - 5.90 10e6/uL    Hemoglobin 16.6 13.3 - 17.7 g/dL    Hematocrit 45.2 40.0 - 53.0 %    MCV 89 78 - 100 fL    MCH 32.7 26.5 - 33.0 pg    MCHC 36.7 (H) 31.5 - 36.5 g/dL    RDW 14.3 10.0 - 15.0 %    Platelet Count 75 (L) 150 - 450 10e3/uL    % Neutrophils 50 %    % Lymphocytes 27 %    % Monocytes 17 %    % Eosinophils 4 %    % Basophils 2 %    % Immature Granulocytes 0 %    NRBCs per 100 WBC 0 <1 /100    Absolute Neutrophils 1.8 1.6 - 8.3 10e3/uL    Absolute Lymphocytes 0.9 0.8 - 5.3 10e3/uL    Absolute Monocytes 0.6 0.0 - 1.3 10e3/uL    Absolute Eosinophils 0.1 0.0 - 0.7 10e3/uL    Absolute Basophils 0.1 0.0 - 0.2 10e3/uL    Absolute Immature Granulocytes 0.0 <=0.4 10e3/uL    Absolute NRBCs 0.0 10e3/uL   Extra Red Top Tube    Collection Time: 06/05/25 10:54 AM   Result Value Ref Range    Hold Specimen JIC    Extra Blue Top Tube    Collection Time: 06/05/25 10:55 AM   Result Value Ref Range    Hold Specimen JIC    Extra Blue Top Tube    Collection Time: 06/06/25  9:52 AM   Result Value Ref Range    Hold Specimen JIC    Extra Red Top Tube    Collection Time: 06/06/25  9:52 AM   Result Value Ref Range    Hold Specimen JIC    Extra Green Top (Lithium Heparin) Tube    Collection Time: 06/06/25  9:52 AM   Result Value Ref Range    Hold Specimen JIC    Extra Purple Top Tube    Collection Time: 06/06/25  9:52 AM   Result Value Ref Range    Hold Specimen JIC    Basic metabolic panel    Collection Time: 06/06/25  9:52 AM   Result Value Ref Range    Sodium 126 (L) 135 - 145 mmol/L    Potassium 4.4 3.4 - 5.3 mmol/L    Chloride 88 (L) 98 - 107 mmol/L    Carbon Dioxide (CO2) 23 22 - 29 mmol/L    Anion Gap 15 7 - 15 mmol/L    Urea Nitrogen 5.1 (L) 6.0 - 20.0  mg/dL    Creatinine 0.91 0.67 - 1.17 mg/dL    GFR Estimate >90 >60 mL/min/1.73m2    Calcium 9.6 8.8 - 10.4 mg/dL    Glucose 94 70 - 99 mg/dL   Hepatic function panel    Collection Time: 06/06/25  9:52 AM   Result Value Ref Range    Protein Total 7.7 6.4 - 8.3 g/dL    Albumin 4.8 3.5 - 5.2 g/dL    Bilirubin Total 1.7 (H) <=1.2 mg/dL    Alkaline Phosphatase 78 40 - 150 U/L    AST 85 (H) 0 - 45 U/L    ALT 99 (H) 0 - 70 U/L    Bilirubin Direct 0.53 (H) 0.00 - 0.30 mg/dL   Lipase    Collection Time: 06/06/25  9:52 AM   Result Value Ref Range    Lipase 43 13 - 60 U/L   Magnesium    Collection Time: 06/06/25  9:52 AM   Result Value Ref Range    Magnesium 1.8 1.7 - 2.3 mg/dL   Ethanol Level Blood    Collection Time: 06/06/25  9:52 AM   Result Value Ref Range    Ethanol Level Blood <0.01 <=0.01 g/dL   Phosphorus    Collection Time: 06/06/25  9:52 AM   Result Value Ref Range    Phosphorus 3.1 2.5 - 4.5 mg/dL   CBC with platelets and differential    Collection Time: 06/06/25  9:52 AM   Result Value Ref Range    WBC Count 4.9 4.0 - 11.0 10e3/uL    RBC Count 4.88 4.40 - 5.90 10e6/uL    Hemoglobin 16.2 13.3 - 17.7 g/dL    Hematocrit 43.8 40.0 - 53.0 %    MCV 90 78 - 100 fL    MCH 33.2 (H) 26.5 - 33.0 pg    MCHC 37.0 (H) 31.5 - 36.5 g/dL    RDW 14.1 10.0 - 15.0 %    Platelet Count 112 (L) 150 - 450 10e3/uL    % Neutrophils 62 %    % Lymphocytes 18 %    % Monocytes 15 %    % Eosinophils 4 %    % Basophils 1 %    % Immature Granulocytes 0 %    NRBCs per 100 WBC 0 <1 /100    Absolute Neutrophils 3.1 1.6 - 8.3 10e3/uL    Absolute Lymphocytes 0.9 0.8 - 5.3 10e3/uL    Absolute Monocytes 0.7 0.0 - 1.3 10e3/uL    Absolute Eosinophils 0.2 0.0 - 0.7 10e3/uL    Absolute Basophils 0.0 0.0 - 0.2 10e3/uL    Absolute Immature Granulocytes 0.0 <=0.4 10e3/uL    Absolute NRBCs 0.0 10e3/uL   Sodium    Collection Time: 06/06/25  9:52 AM   Result Value Ref Range    Sodium 126 (L) 135 - 145 mmol/L   Urine Drug Screen Panel    Collection Time: 06/06/25  "11:48 AM   Result Value Ref Range    Amphetamines Urine Screen Negative Screen Negative    Barbituates Urine Screen Negative Screen Negative    Benzodiazepine Urine Screen Negative Screen Negative    Cannabinoids Urine Screen Negative Screen Negative    Cocaine Urine Screen Negative Screen Negative    Fentanyl Qual Urine Screen Negative Screen Negative    Opiates Urine Screen Negative Screen Negative    PCP Urine Screen Negative Screen Negative   Sodium    Collection Time: 06/06/25  1:10 PM   Result Value Ref Range    Sodium 128 (L) 135 - 145 mmol/L   Sodium    Collection Time: 06/06/25  3:49 PM   Result Value Ref Range    Sodium 130 (L) 135 - 145 mmol/L   GGT    Collection Time: 06/06/25  9:40 PM   Result Value Ref Range     (H) 8 - 61 U/L   TSH with free T4 reflex    Collection Time: 06/06/25  9:40 PM   Result Value Ref Range    TSH 2.33 0.30 - 4.20 uIU/mL   Hemoglobin A1c    Collection Time: 06/06/25  9:40 PM   Result Value Ref Range    Estimated Average Glucose 105 <117 mg/dL    Hemoglobin A1C 5.3 <5.7 %          Physical and Psychiatric Examination:     /86 (BP Location: Left arm, Patient Position: Supine, Cuff Size: Adult Regular)   Pulse 58   Temp 98.1  F (36.7  C) (Oral)   Resp 16   Ht 1.753 m (5' 9\")   Wt 88.5 kg (195 lb)   SpO2 95%   BMI 28.80 kg/m    Weight is 195 lbs 0 oz  Body mass index is 28.8 kg/m .    Physical Exam:  I have reviewed the physical exam as documented by the medical team and agree with findings and assessment and have no additional findings to add at this time.    Mental Status Exam:  Appearance: awake, alert and adequately groomed  Attitude:  cooperative  Eye Contact:  good  Mood:  good  Affect:  mood congruent  Speech:  clear, coherent  Language: fluent and intact in English  Psychomotor, Gait, Musculoskeletal:  no evidence of tardive dyskinesia, dystonia, or tics  Thought Process:  goal oriented  Associations:  no loose associations  Thought Content:  no evidence " "of suicidal ideation or homicidal ideation and no evidence of psychotic thought  Insight:  fair  Judgement:  fair  Oriented to:  time, person, and place  Attention Span and Concentration:  intact  Recent and Remote Memory:  fair  Fund of Knowledge:  appropriate         Admission Diagnoses:     Alcohol use disorder, severe  Alcohol withdrawal with unspecified complication   History of situational anxiety    Clinically Significant Risk Factors Present on Admission         # Hyponatremia: Lowest Na = 126 mmol/L in last 2 days, will monitor as appropriate  # Hypochloremia: Lowest Cl = 88 mmol/L in last 2 days, will monitor as appropriate          # Thrombocytopenia: Lowest platelets = 75 in last 2 days, will monitor for bleeding       # Hypertension: Home medication list includes antihypertensive(s)               # Overweight: Estimated body mass index is 28.8 kg/m  as calculated from the following:    Height as of this encounter: 1.753 m (5' 9\").    Weight as of this encounter: 88.5 kg (195 lb).                        Assessment & Plan:     1) MSSA with Valium.   2) Patient to be provided with information on MAT for AUD.   3) Patient plans to detox only.     Disposition Plan   Reason for ongoing admission: requires detoxification from substance that poses a risk of bodily harm during withdrawal period  Discharge location: home with self-care  Discharge Medications: not ordered  Follow-up Appointments: not scheduled  Legal Status: voluntary    Entered by: Haja Wu MD on 6/7/2025 at 4:35 AM            "

## 2025-06-07 NOTE — PLAN OF CARE
"[facility] Admission Note    S = Situation:   Simeon Toussaint is a 54 year old year old male with a chief complaint of No chief complaint on file.    Voluntary admission to [facility] for Alcohol withdrawal and detox.    B  = Background:   Substance use history: Alcohol  Mental health history: Depression and anxiety  Medical history: HTN  Legal history: Voluntary  Treatment history: None  Living situation: Lives with significant other  Recent life stressors: Stress of taking care of his dad who is in a group home.     A  =  Assessment:   During the adnission interview patiented affect as flat and blunted while mood was calm and cooperative. He denied pain or discomfort  and reports that he stopped drinking alcohol yesterday and was seen in the emergency department (ED) yesterday morning. Today, he mentions that his withdrawal symptoms have persisted, particularly his shakes. He also notes that he had elevated blood pressure yesterday, which continues to be high today. The patient has quit alcohol three times in the past, with the most recent relapse occurring about three weeks ago after discontinuing Prozac. Prior to stopping alcohol yesterday, he was consuming approximately 12 beers daily. He has no history of withdrawal seizures or DTs and has not been hospitalized for alcohol-related issues or undergone detox treatment before. Patient also reports rash on his back related to tick bite.  MSSA 9  BP (!) 161/99 (BP Location: Right arm, Patient Position: Sitting)   Temp 98.2  F (36.8  C) (Oral)   Resp 12   Ht 1.753 m (5' 9\")   Wt 88.5 kg (195 lb)   SpO2 98%   BMI 28.80 kg/m       R =   Request or Recommendation:   Patient's alcohol withdrawal will be monitored and treated using MSSA with valium.  Pt will meet with psychiatry, internal medicine, and case management tomorrow.  At the time of admission, pt reports discharge plan is Detox and do out patient treatment.    " Patient informed of CT results and recommendation below. She verbalized agreement and understanding. Referral placed. She is aware that she will receive a call to schedule.

## 2025-06-07 NOTE — PLAN OF CARE
Rehab Group    Start time: 1645  End time: 1710  Patient time total: 15 minutes    attended partial group    #7 attended   Group Type: occupational therapy and general health and coping   Group Topic Covered: cognitive activities, coping skills, healthy leisure time, and problem solving   Group Session Detail:OT Topic Group: Cognitive wellness and healthy leisure:Patient engaged in a leisure activity with a visuospatial and cognitive component in order to promote: problem solving skills, improve attention, emphasize new learning, exercise cognitive skills/recall, and foster healthy leisure exploration    Patient Response/Contribution:  cooperative with task, socially appropriate, and actively engaged   Patient Detail:pt calm. Pt polite and pleasant during interactions. Pt does brighten some with social engagement. Pt shared he enjoys word games for his cognitive and overall wellness. Pt engaged in group discussion on cognitive wellness and orientation to tasks available on the unit. Pt was ind with task following initial instructions and demonstration. Pt remained focused for 1 round of activity. Pt responses were in context. Pt endorsed positive response to activity. Pt declined further engagement.       [id number] OT Group (2 or more in attendance)      Patient Active Problem List   Diagnosis    Dermatophytosis of groin and perianal area    Venereal wart    ACP (advance care planning)    Hand laceration, right, sequela    Alcohol withdrawal (H)

## 2025-06-07 NOTE — PLAN OF CARE
Problem: Alcohol Withdrawal  Goal: Alcohol Withdrawal Symptom Control  Outcome: Progressing   Goal Outcome Evaluation:    Plan of Care Reviewed With: patient        MSSA 8 earlier at 4pm, Hypertensive at 171/96, Valium 10mg given. , Atenolol administered. P 68. Hydroxyzine administered, pt relaxing in the milieu watching TV with peers, denies SI/HI, pt encouraged to increase fluid intake and to set up a PCP appointment for follow up care when OOD.    Encouraged to fill out discharge paperwork.

## 2025-06-07 NOTE — PLAN OF CARE
Problem: Alcohol Withdrawal  Goal: Alcohol Withdrawal Symptom Control  Outcome: Progressing   Goal Outcome Evaluation:       Pt sleeping when writer resumed shift and pt breathing with no distress and with even breathing pattern. Pt scored 2 and 4 for MSSA and no prn administer during the night shift.

## 2025-06-08 VITALS
OXYGEN SATURATION: 98 % | BODY MASS INDEX: 28.88 KG/M2 | SYSTOLIC BLOOD PRESSURE: 167 MMHG | HEIGHT: 69 IN | TEMPERATURE: 96.9 F | WEIGHT: 195 LBS | HEART RATE: 64 BPM | RESPIRATION RATE: 18 BRPM | DIASTOLIC BLOOD PRESSURE: 107 MMHG

## 2025-06-08 PROCEDURE — 99239 HOSP IP/OBS DSCHRG MGMT >30: CPT | Performed by: PSYCHIATRY & NEUROLOGY

## 2025-06-08 PROCEDURE — 250N000013 HC RX MED GY IP 250 OP 250 PS 637: Performed by: PSYCHIATRY & NEUROLOGY

## 2025-06-08 RX ORDER — LANOLIN ALCOHOL/MO/W.PET/CERES
100 CREAM (GRAM) TOPICAL DAILY
Status: SHIPPED
Start: 2025-06-09

## 2025-06-08 RX ORDER — FOLIC ACID 1 MG/1
1 TABLET ORAL DAILY
Status: SHIPPED
Start: 2025-06-09

## 2025-06-08 RX ADMIN — Medication 1 TABLET: at 08:08

## 2025-06-08 RX ADMIN — FOLIC ACID 1 MG: 1 TABLET ORAL at 08:08

## 2025-06-08 RX ADMIN — THIAMINE HCL TAB 100 MG 100 MG: 100 TAB at 08:08

## 2025-06-08 RX ADMIN — FERRIC OXIDE RED: 8; 8 LOTION TOPICAL at 08:08

## 2025-06-08 ASSESSMENT — ACTIVITIES OF DAILY LIVING (ADL)
ADLS_ACUITY_SCORE: 15
HYGIENE/GROOMING: INDEPENDENT
ADLS_ACUITY_SCORE: 15
DRESS: INDEPENDENT
ADLS_ACUITY_SCORE: 15
ORAL_HYGIENE: INDEPENDENT
ADLS_ACUITY_SCORE: 15

## 2025-06-08 NOTE — PLAN OF CARE
Problem: Alcohol Withdrawal  Goal: Alcohol Withdrawal Symptom Control  Outcome: Progressing     Problem: Sleep Disturbance  Goal: Adequate Sleep/Rest  Outcome: Progressing   Goal Outcome Evaluation:       Pt.was monitored overnight for acute alcohol withdrawal symptoms. He scored 3 on MSSA. No prn medication administered overnight. Pt.appeared asleep and comfortable through the night. Breathing was steady and spontaneous. No safety or behavioral concerns observed or reported. Will continue to monitor.

## 2025-06-08 NOTE — PLAN OF CARE
The patient has been discharged to home and reports being picked up by his daughter. A psychiatric associate, neno, escorted the patient off the unit.    All of the patient's belongings, including items from the room, a locked bin, and security, were sent with the patient.     This registered nurse (RN) reviewed the discharge instructions, educational materials, the patient's laboratory results, and discharge recommendations with the patient. The RN also reviewed the prescribed medications that the patient will be taking home from the pharmacy.    The patient verbalized and demonstrated understanding of all the teachings provided. The patient denied any thoughts of self-harm or harm towards others and has no current medical issues at this time.     The patient has no further questions and is officially discharged at this time 4 :20 PM

## 2025-06-08 NOTE — DISCHARGE SUMMARY
"Psychiatric Discharge Summary    Simeon Toussaint MRN# [id number]   Age: 54 year old YOB: 1970     Date of Admission:  6/6/2025  Date of Discharge:  6/8/2025  Admitting Physician:  Haja Wu MD  Discharge Physician:  Ann Medina MD (Contact: [phone])         Event Leading to Hospitalization:   Per H&P:    The patient is a 55yo male with a history of alcohol use disorder and anxiety who was admitted to detoxify from alcohol. He reports that he is feeling \"pretty good.\" Did get some sleep last night. Says that his nausea and vomiting have decreased and he was able to eat and drink some. Mood is good. Denies SI. Does report being on Prozac and Buspar in the past due to \"situational anxiety\" as his father was in a car accident and the patient had to go through his home (father is a hoarder) and put father in a group home. When he stopped his medications, he felt that this triggered a relapse. Also was diagnosed with Lyme disease recently. BP has been high.       Per ER:     Simeon Toussaint is a 54 year old male who presents to the ED for withdrawal. The patient states that he stopped drinking alcohol yesterday, and was seen in the ED for this yesterday morning. He had an IV placed and was prescribed nausea medication as well as withdrawal medication. He states that today, his withdrawal symptoms have persisted, specifically his shaking. He has also has nausea but no vomiting today. He did have vomiting previously, though he attributes this to a medication he was on for lyme disease. He adds that he had elevated BP yesterday, which has also persisted today. Patient reports that he has quit alcohol 3 times previously. The last time he relapsed was about 3 weeks ago after coming off of Prozac. He was drinking about 12 beers daily before stopping yesterday. Patient has no history of withdrawal seizures or confusion. He has not previously been hospitalized for alcohol related " issues or been to detox treatment. His significant other states that he desires in patient treatment at this time. Patient denies any hematemesis, abdominal pain, chest pain, or shortness of breath. Denies any suicidal ideation or homicidal ideation. No recent falls or head injuries. He is not a smoker.           See Admission note by Haja Wu MD on 6/7/25 for additional details.          Diagnoses:     Alcohol use disorder, severe  Alcohol withdrawal with unspecified complication   History of situational anxiety  Recent discontinuation syndrome following discontinuation of Prozac  Hyponatremia  Tick bite and Lyme Dz  Mild thrombocytopenia  Mild hyperbilirubinemia  Transaminitis         Labs:     Recent Results (from the past week)   EKG 12-lead, tracing only    Collection Time: 06/05/25 10:39 AM   Result Value Ref Range    Systolic Blood Pressure  mmHg    Diastolic Blood Pressure  mmHg    Ventricular Rate 65 BPM    Atrial Rate 65 BPM    NJ Interval 216 ms    QRS Duration 88 ms     ms    QTc 430 ms    P Axis 51 degrees    R AXIS 21 degrees    T Axis 84 degrees    Interpretation ECG       Sinus rhythm with 1st degree A-V block  Otherwise normal ECG  No previous ECGs available  Unconfirmed report - interpretation of this ECG is computer generated - see medical record for final interpretation  Confirmed by - EMERGENCY ROOM, PHYSICIAN ([id number]),  Terry Terrell ([id number]) on 6/6/2025 7:38:41 AM     Comprehensive metabolic panel    Collection Time: 06/05/25 10:54 AM   Result Value Ref Range    Sodium 130 (L) 135 - 145 mmol/L    Potassium 4.0 3.4 - 5.3 mmol/L    Carbon Dioxide (CO2) 24 22 - 29 mmol/L    Anion Gap 17 (H) 7 - 15 mmol/L    Urea Nitrogen 4.6 (L) 6.0 - 20.0 mg/dL    Creatinine 0.75 0.67 - 1.17 mg/dL    GFR Estimate >90 >60 mL/min/1.73m2    Calcium 9.0 8.8 - 10.4 mg/dL    Chloride 89 (L) 98 - 107 mmol/L    Glucose 97 70 - 99 mg/dL    Alkaline Phosphatase 79 40 - 150 U/L     (H) 0 - 45  U/L     (H) 0 - 70 U/L    Protein Total 7.8 6.4 - 8.3 g/dL    Albumin 4.6 3.5 - 5.2 g/dL    Bilirubin Total 0.9 <=1.2 mg/dL   Lipase    Collection Time: 06/05/25 10:54 AM   Result Value Ref Range    Lipase 49 13 - 60 U/L   Magnesium    Collection Time: 06/05/25 10:54 AM   Result Value Ref Range    Magnesium 1.8 1.7 - 2.3 mg/dL   Ethanol Level Blood    Collection Time: 06/05/25 10:54 AM   Result Value Ref Range    Ethanol Level Blood 0.25 (H) <=0.01 g/dL   CBC with platelets and differential    Collection Time: 06/05/25 10:54 AM   Result Value Ref Range    WBC Count 3.5 (L) 4.0 - 11.0 10e3/uL    RBC Count 5.07 4.40 - 5.90 10e6/uL    Hemoglobin 16.6 13.3 - 17.7 g/dL    Hematocrit 45.2 40.0 - 53.0 %    MCV 89 78 - 100 fL    MCH 32.7 26.5 - 33.0 pg    MCHC 36.7 (H) 31.5 - 36.5 g/dL    RDW 14.3 10.0 - 15.0 %    Platelet Count 75 (L) 150 - 450 10e3/uL    % Neutrophils 50 %    % Lymphocytes 27 %    % Monocytes 17 %    % Eosinophils 4 %    % Basophils 2 %    % Immature Granulocytes 0 %    NRBCs per 100 WBC 0 <1 /100    Absolute Neutrophils 1.8 1.6 - 8.3 10e3/uL    Absolute Lymphocytes 0.9 0.8 - 5.3 10e3/uL    Absolute Monocytes 0.6 0.0 - 1.3 10e3/uL    Absolute Eosinophils 0.1 0.0 - 0.7 10e3/uL    Absolute Basophils 0.1 0.0 - 0.2 10e3/uL    Absolute Immature Granulocytes 0.0 <=0.4 10e3/uL    Absolute NRBCs 0.0 10e3/uL   Extra Red Top Tube    Collection Time: 06/05/25 10:54 AM   Result Value Ref Range    Hold Specimen J[facility]    Extra Blue Top Tube    Collection Time: 06/05/25 10:55 AM   Result Value Ref Range    Hold Specimen JIC    Extra Blue Top Tube    Collection Time: 06/06/25  9:52 AM   Result Value Ref Range    Hold Specimen JIC    Extra Red Top Tube    Collection Time: 06/06/25  9:52 AM   Result Value Ref Range    Hold Specimen J[facility]    Extra Green Top (Lithium Heparin) Tube    Collection Time: 06/06/25  9:52 AM   Result Value Ref Range    Hold Specimen [facility]    Extra Purple Top Tube    Collection Time: 06/06/25  9:52  AM   Result Value Ref Range    Hold Specimen [facility]    Basic metabolic panel    Collection Time: 06/06/25  9:52 AM   Result Value Ref Range    Sodium 126 (L) 135 - 145 mmol/L    Potassium 4.4 3.4 - 5.3 mmol/L    Chloride 88 (L) 98 - 107 mmol/L    Carbon Dioxide (CO2) 23 22 - 29 mmol/L    Anion Gap 15 7 - 15 mmol/L    Urea Nitrogen 5.1 (L) 6.0 - 20.0 mg/dL    Creatinine 0.91 0.67 - 1.17 mg/dL    GFR Estimate >90 >60 mL/min/1.73m2    Calcium 9.6 8.8 - 10.4 mg/dL    Glucose 94 70 - 99 mg/dL   Hepatic function panel    Collection Time: 06/06/25  9:52 AM   Result Value Ref Range    Protein Total 7.7 6.4 - 8.3 g/dL    Albumin 4.8 3.5 - 5.2 g/dL    Bilirubin Total 1.7 (H) <=1.2 mg/dL    Alkaline Phosphatase 78 40 - 150 U/L    AST 85 (H) 0 - 45 U/L    ALT 99 (H) 0 - 70 U/L    Bilirubin Direct 0.53 (H) 0.00 - 0.30 mg/dL   Lipase    Collection Time: 06/06/25  9:52 AM   Result Value Ref Range    Lipase 43 13 - 60 U/L   Magnesium    Collection Time: 06/06/25  9:52 AM   Result Value Ref Range    Magnesium 1.8 1.7 - 2.3 mg/dL   Ethanol Level Blood    Collection Time: 06/06/25  9:52 AM   Result Value Ref Range    Ethanol Level Blood <0.01 <=0.01 g/dL   Phosphorus    Collection Time: 06/06/25  9:52 AM   Result Value Ref Range    Phosphorus 3.1 2.5 - 4.5 mg/dL   CBC with platelets and differential    Collection Time: 06/06/25  9:52 AM   Result Value Ref Range    WBC Count 4.9 4.0 - 11.0 10e3/uL    RBC Count 4.88 4.40 - 5.90 10e6/uL    Hemoglobin 16.2 13.3 - 17.7 g/dL    Hematocrit 43.8 40.0 - 53.0 %    MCV 90 78 - 100 fL    MCH 33.2 (H) 26.5 - 33.0 pg    MCHC 37.0 (H) 31.5 - 36.5 g/dL    RDW 14.1 10.0 - 15.0 %    Platelet Count 112 (L) 150 - 450 10e3/uL    % Neutrophils 62 %    % Lymphocytes 18 %    % Monocytes 15 %    % Eosinophils 4 %    % Basophils 1 %    % Immature Granulocytes 0 %    NRBCs per 100 WBC 0 <1 /100    Absolute Neutrophils 3.1 1.6 - 8.3 10e3/uL    Absolute Lymphocytes 0.9 0.8 - 5.3 10e3/uL    Absolute Monocytes 0.7 0.0  - 1.3 10e3/uL    Absolute Eosinophils 0.2 0.0 - 0.7 10e3/uL    Absolute Basophils 0.0 0.0 - 0.2 10e3/uL    Absolute Immature Granulocytes 0.0 <=0.4 10e3/uL    Absolute NRBCs 0.0 10e3/uL   Sodium    Collection Time: 06/06/25  9:52 AM   Result Value Ref Range    Sodium 126 (L) 135 - 145 mmol/L   Urine Drug Screen Panel    Collection Time: 06/06/25 11:48 AM   Result Value Ref Range    Amphetamines Urine Screen Negative Screen Negative    Barbituates Urine Screen Negative Screen Negative    Benzodiazepine Urine Screen Negative Screen Negative    Cannabinoids Urine Screen Negative Screen Negative    Cocaine Urine Screen Negative Screen Negative    Fentanyl Qual Urine Screen Negative Screen Negative    Opiates Urine Screen Negative Screen Negative    PCP Urine Screen Negative Screen Negative   Sodium    Collection Time: 06/06/25  1:10 PM   Result Value Ref Range    Sodium 128 (L) 135 - 145 mmol/L   Sodium    Collection Time: 06/06/25  3:49 PM   Result Value Ref Range    Sodium 130 (L) 135 - 145 mmol/L   GGT    Collection Time: 06/06/25  9:40 PM   Result Value Ref Range     (H) 8 - 61 U/L   TSH with free T4 reflex    Collection Time: 06/06/25  9:40 PM   Result Value Ref Range    TSH 2.33 0.30 - 4.20 uIU/mL   Hemoglobin A1c    Collection Time: 06/06/25  9:40 PM   Result Value Ref Range    Estimated Average Glucose 105 <117 mg/dL    Hemoglobin A1C 5.3 <5.7 %   Basic metabolic panel    Collection Time: 06/07/25 11:27 AM   Result Value Ref Range    Sodium 133 (L) 135 - 145 mmol/L    Potassium 4.3 3.4 - 5.3 mmol/L    Chloride 96 (L) 98 - 107 mmol/L    Carbon Dioxide (CO2) 25 22 - 29 mmol/L    Anion Gap 12 7 - 15 mmol/L    Urea Nitrogen 10.3 6.0 - 20.0 mg/dL    Creatinine 1.07 0.67 - 1.17 mg/dL    GFR Estimate 81 >60 mL/min/1.73m2    Calcium 9.9 8.8 - 10.4 mg/dL    Glucose 68 (L) 70 - 99 mg/dL   Extra Purple Top Tube    Collection Time: 06/07/25 11:27 AM   Result Value Ref Range    Hold Specimen JIC           Consults:    Consultation during this admission received from internal medicine on 6/7/25:    Assessment & Plan  Simeon Toussaint is a 54 year old yo male with hx of etoh use disorder, admitted to  psychiatric detox unit (3A) after presenting to ED with etoh abuse and desire for medically supervised etoh withdrawal. Internal medicine consulted for medical co-management.      # Etoh use disorder and sz withdrawal ppx: Management per psychiatry primary team.      # Hyponatremia: Initial Na at . Improved to 130 yesterday. And again to 133 today. Likely hypovolemic from poor po fluid intake PTA other than etoh.   - Follow up with PCP after discharge     # Tick bite: Given 200 mg prophylactic dose of doxycycline for tick bite on his back.   - No further medical intervention indicated at this time.      # Elevated blood pressure: BPs currently 150s / low 100s likely due to etoh withdrawal and anxiety.  - Follow up with PCP in 1-2 weeks after discharge for hospital follow up and repeat BP check.   - Prn po hydralazine with parameters to give in interim.      # Transaminitis  # Mild hyperbilirubinemia  # Mild thrombocytopenia   AST and ALT improved on repeat labs. T bili only mildly elevated. Plt count improved as well. Likely due to his heavy etoh use PTA and expect to resolve and pt continues to detoxify and abstain from future etoh use.  - Follow up with PCP in 1-2 weeks after discharge for hospital follow up and repeat CBC and hepatic panel.          The patient's care was discussed with the Patient and communicated to primary team via this note. Medicine signing off. Please feel free to call with questions.       David Fontenot PA-C           Hospital Course:   Patient was admitted to Station 3[facility] with attending Ann Medina MD as a voluntary patient. The patient was placed under status 15 (15 minute checks) to ensure patient safety.     MSSA protocol was initiated due to the patient's history of  "alcohol abuse and concern for withdrawal symptoms.  Over the course of hospitalization, patient was treated with Valium to manage withdrawal. Last dose of Valium was given on 6/7/25. He completed detox on 6/8/25. He required a TOTAL of 50 mg of Valium since arrival in ED. He has not experienced any significant symptoms of withdrawal since that time. He experienced no complications associated with withdrawal. Hydroxyzine and Trazodone were utilized prn for management of anxiety and sleep, respectively. Patient was treated with multivitamin, thiamine, and folic acid daily. He was evaluated by IM (see above). He was medically cleared at time of discharge. MAT were discussed, and patient prefers to \"see if I can go without it because I hate medication. I don't have cravings and I have no desire to drink. I will never drink again.\" When discussing Antabuse specifically, patient said that he works out a lot and uses alcohol based products frequently to clean his work out equipment.     On the day of discharge, patient shared that he experienced \"dark, suicidal thoughts\" about one month ago in context of stopping Prozac, and noted that he had never previously experienced any suicidal thinking. He relapsed on alcohol at that time to manage some of those thoughts and then alcohol use gradually increased. Also noted several recent stressors, including father's illness, tick bite and subsequent diagnosis of Lyme Dz, bad reaction from doxycycline due to alcohol use, and then his life partner's mother's fall at his home resulting in her need for hospitalization. He said that he has not experienced SI thoughts for one month and is feeling much better. Does not want any pharmacologic management because it is situational and he says that he does not ever want to endure what he endured with discontinuation of Prozac. Future oriented. Help seeking.     Per CTC note dated 6/8/25, treatment plan includes:      Pt shares he has been " "struggling with alcohol abuse for a little over a year. He reports stress from caring for his father that is a hoarder and was recently moved to a group home due to being unable to care for himself has led him to increased use of alcohol. Pt shared he has attempted to quit drinking on his own 3 times in the past year the longest period was 4 months and thinking he could return to \"normal use\" led him to relapse. He shares his girlfriends drink occasional however has told him she will not drink at all in order to support him. Pt denies the need for CAMERON treatment however may be interested in MAT. Writer suggested individual therapy to help him process stress and reported anxiety related to his carrying for his dad but he declined. Pt shares he does not have a PCP and writer will schedule him an appt if he is still her on Monday.     Patient denied alcohol cravings at time of discharge. He understands risks associated with relapse. Appears to be motivated to maintain sobriety upon discharge.      He did participate in groups and was visible in the milieu.     The patient's symptoms of withdrawal fully resolved.     Patient was released to home. At the time of discharge, patient was determined to not be a danger to himself or others. He consistently denied SI/HI, and remained future oriented. He said that he has not had suicidal thinking for the past month. Denied access to firearms.     Because this patient meets criteria for an Alcohol Use Disorder, I performed the following brief intervention on the date of this note:              1) Expressed concern that the patient is drinking at unhealthy levels known to increase their risk of alcohol related problems              2) Gave feedback linking alcohol use and health, including personalized feedback explaining how alcohol use can interact with their medical and/or psychiatric problems, and with prescribed medications.              3) Advised patient to abstain. "              Discharge Medications:     Current Discharge Medication List        CONTINUE these medications which have NOT CHANGED    Details   cloNIDine (CATAPRES) 0.2 MG tablet Take 1 tablet (0.2 mg) by mouth 3 times daily as needed (Witrhdrawal symptoms).  Qty: 15 tablet, Refills: 0      ondansetron (ZOFRAN) 4 MG tablet Take 4 mg by mouth every 8 hours as needed for nausea.                 Psychiatric Examination:   Appearance:  awake, alert, adequately groomed, and slightly anxious  Attitude:  cooperative  Eye Contact:  good  Mood:  good  Affect:  mood congruent, slightly anxious  Speech:  clear, coherent  Psychomotor Behavior:  no evidence of tardive dyskinesia, dystonia, or tics  Thought Process:  logical, linear, and goal oriented  Associations:  no loose associations  Thought Content:  no evidence of suicidal ideation or homicidal ideation and no evidence of psychotic thought  Insight:  good  Judgment:  intact  Oriented to:  time, person, and place  Attention Span and Concentration:  intact  Recent and Remote Memory:  intact  Language: Able to name objects, Able to repeat phrases, and Able to read and write  Fund of Knowledge: appropriate  Muscle Strength and Tone: normal  Gait and Station: Normal         Discharge Plan:   Summary: You were admitted to Station 3A on 06/06/2025 for detoxification from alcohol.  A medical exam was performed that included lab work. You have met with a [facility] Counselor and opted to return home.  Please take care and make your recovery a daily priority, Simeon!  It was a pleasure working with you and the entire treatment team here wishes you the very best in your recovery!      Recommendation:  Please abstain from the use of all mood altering chemicals.      Main Diagnoses:       Alcohol use disorder, severe  Alcohol withdrawal with unspecified complication   History of situational anxiety     Clinically Significant Risk Factors Present on Admission          # Hyponatremia:  Lowest Na = 126 mmol/L in last 2 days, will monitor as appropriate  # Hypochloremia: Lowest Cl = 88 mmol/L in last 2 days, will monitor as appropriate           # Thrombocytopenia: Lowest platelets = 75 in last 2 days, will monitor for bleeding     # Hypertension: Home medication list includes antihypertensive(s)        Major Treatments, Procedures and Findings:  You were treated for Alcohol  detoxification using Valium.  You have met with a [facility] counselor to develop a treatment plan for discharge. You had labs drawn and those results were reviewed with you. Please take a copy of your lab work with you to your next primary care provider appointment.     Symptoms to Report:  If you experience more anxiety, confusion, sleeplessness, deep sadness or thoughts of suicide, notify your treatment team or notify your primary care provider. IF ANY OF THE SYMPTOMS YOU ARE EXPERIENCING ARE A MEDICAL EMERGENCY CALL 911 IMMEDIATELY.      Lifestyle Adjustment: Adjust your lifestyle to get enough sleep, relaxation, exercise and good nutrition. Continue to develop healthy coping skills to decrease stress and promote a sober living environment. Do not use mood altering substances including alcohol, illegal drugs or addictive medications other than what is currently prescribed.      Disposition: Home     Facts about COVID19 at www.cdc.gov/COVID19 and www.[place].gov/covid19     Keeping hands clean is one of the most important steps we can take to avoid getting sick and spreading germs to others.  Please wash your hands frequently and lather with soap for at least 20 seconds!     Follow-up Appointment:   You are aware you should make a follow up appointment with your primary care doctor for medical and medication management      Recovery apps for your phone for educational purposes and to locate in person and zoom recovery meetings  Everything AA -  alexander is a great resource  12 Step Toolkit - educational purpose learning about the 12 steps  to recovery  Pink Cloud - meeting alexander  AA  - meeting alexander  Meeting guide - meeting alexander  Quick NA meeting - meeting alexander  Isaak- has various apps     Patient Navigation Hub  [facility] Navigators work to be your point-of-contact for trustworthy and compassionate care from Inpatient services to [facility] Programmatic Care. We will provide resources and communication to help guide you into programmatic care. Ultimately, our goal is to be the one-stop-shop of communication, coordination, and support for your journey to programmatic care.  Phone: [phone]     Resources:  AA/NA meetings have returned to in-person or a hybrid combination of zoom/in-person therefore please check online to verify time.  Need Support Now? If you or someone you know is struggling or in crisis, help is available. Call or text [employer]5 or chat [employer].org  AA meetings search for them at: https://aa-intergroup.org (worldwide meeting listings)  AA meetings for [place] area can be found online at: https://[place].org (click local online meetings listings)  NA meetings for [place] area can be found online at: https://www.nam[place].org  (click find a meeting)  AA and NA Sponsors are excellent resources for support and you can find one at any support group meeting.   Alcoholics Anonymous (https://aa.org/): for information 24 hours/day  AA Intergroup service office in [place] (http://www.aastpaul.org/) 682.320.7968  AA Intergroup service office in [facility]: 547.298.8717. (http://www.a[place].org/)  Narcotics Anonymous (www.nam[place].org) (773) 701-2587  https://aafairviewriverside.org/meetings  SMART Recovery - self management for addiction recovery:  www.smartrecovery.org  Pathways ~ A Health Crisis Resource & Support Center:  341.930.4446.  https://prescribetoprevent.org/patient-education/videos/  http://www.harmreduction.org  Crisis Text Line  Text 381064  You will be connected with a trained live  "crisis counselor to provide support. Por espanol, texto  VIRI a 087603 o texto a 442-AYUDAME en WhatsApp  [place] Hope Line  1.800.SUICIDE [1011817]  [facility] 769-250-1506  Support Group:  AA/NA and Sponsor/support.  Fast Tracker  Linking people to mental health and substance use disorder resources  [employer].org   [place] Mental Health Warm Line  Peer to peer support  Monday thru Saturday, 12 pm to 10 pm  445.253.7801 or 8.421.141.6776  Text \"Support\" to 54980  National [place] on Mental Illness (DENIZ)  734.348.7739 or 1.888.DENIZ.HELPS  Alcoholics Anonymous (www.alcoholics-anonymous.org): Check your phone book for your local chapter.  Suicide Awareness Voices of Education (SAVE) (www.save.org): 611-833-XHBI (7283)  National Suicide Prevention Line (www.mentalhealthmn.org): 327-907-CDFO (3076)  Mental Health Consumer/Survivor Network of [place] (www.mhcsn.net): 603.450.5320 or 896-549-8637  Mental Health Association of [place] (www.mentalhealth.org): 111.741.2805 or 358-155-2467   Substance Abuse and Mental Health Services (www.samhsa.gov)  [place] Opioid Prevention Coalition: www.opioidcoalition.org     [place] Recovery Connection (MRC)  [facility] connects people seeking recovery to resources that help foster and sustain long-term recovery.  Whether you are seeking resources for treatment, transportation, housing, job training, education, health care or other pathways to recovery, [facility] is a great place to start.  870.283.2911.  www.[place]arecSnaptiv[place].org     Great Pod casts for nutrition and wellness  Listen on Apple Podcasts  Dishing Up Nutrition   [employer]   Nutrition       Understand the connection between what you eat and how you feel. Hosted by licensed nutritionists and dietitians from [employer] we share practical, real-life solutions for healthier living through nutrition.      General Medication Instructions:   See your medication sheet(s) " for instructions.   Take all medications as prescribed.  Make no changes unless your primary care provider suggests them.   Go to all your primary care provider visits.  Be sure to have all your required lab tests. This way, your medicines can be refilled on time.  Do not use any forms of alcohol.     Please Note: If you have any questions at anytime after you discharged please call [facility] detox unit 3A at [phone].     Here are a list of additional numbers you can call if you are wanting to resume services through [facility]:  [facility] Assessment Intake: [phone]  [facility] Adult [facility] Intensive Outpatient  call: [phone]  [facility] Admissions [phone]    [facility] call: [phone]  [facility]: [phone]  Medical Records call: [phone]  Billing Department call: [phone]     Please remember to take all of your behavioral discharge planning and lab paperwork to any follow up appointments, it contains your lab results, diagnosis, medication list and discharge recommendations.       THANK YOU FOR CHOOSING [facility]     Attestation:  The patient has been seen and evaluated by me,  Ann Medina MD'    > 35 minutes total time that was spent and over 50% of this time was spent in counseling and coordination of care with staff, reviewing medical record, educating patient about treatment options, side effects and benefits and alternative treatments for medications, providing supportive therapy and redirection regarding above symptoms.     This document is created with the help of Dragon dictation system.  All grammatical/typing errors or context distortion are unintentional and inherent to software.

## 2025-06-08 NOTE — PROGRESS NOTES
"[address]     Daily Encounter: Met with team, discussed patient progress, discharge plan and any impediments to discharge.    Pt shares he has been struggling with alcohol abuse for a little over a year. He reports stress from caring for his father that is a hoarder and was recently moved to a group home due to being unable to care for himself has led him to increased use of alcohol. Pt shared he has attempted to quit drinking on his own 3 times in the past year the longest period was 4 months and thinking he could return to \"normal use\" led him to relapse. He shares his girlfriends drink occasional however has told him she will not drink at all in order to support him. Pt denies the need for [facility] treatment however may be interested in MAT. Writer suggested individual therapy to help him process stress and reported anxiety related to his carrying for his dad but he declined. Pt shares he does not have a PCP and writer will schedule him an appt if he is still her on Monday.   Insurance: [employer] [place]      Legal Status:  Voluntary     SUDs Assessment Status: Not needed.      ROIs on file: None att his time.      Living Situation: Lives in his own home with girlfriend.      Current Providers, Supports & Collateral:  Girlfriend.     Current Plan/Referral Status: Detox only, maybe MAT     Safety Plan Status: pt declined to complete the safety plan      HEATH Duarte  [facility] [facility]           "

## 2025-06-08 NOTE — PLAN OF CARE
Problem: Alcohol Withdrawal  Goal: Alcohol Withdrawal Symptom Control  Outcome: Progressing   Goal Outcome Evaluation:    Plan of Care Reviewed With: patient       Pt flat and isolative to self, endorsing a good appetite and hydration, slightly hypertensive this shift at 165/103 and trending down to 152/93.    MSSA 2/4, pt denies psych symptoms, showered and applied calamine lotion to itchy areas on front and back torso .     Pt attended AA , attended AA. Last Valium given at 4.32pm. Daughter and girlfriend on the list to visit at 3pm, wanting to discharge today when out of detox at 4.32 pm    Daughter would be willing to  and transport pt at 5 pm, pt discharging non med.

## 2025-06-08 NOTE — PLAN OF CARE
"Problem: Alcohol Withdrawal  Goal: Alcohol Withdrawal Symptom Control  Outcome: Progressing     Goal Outcome Evaluation:    Pt scored MSSA of 5, no PRN medications administered for alcohol withdrawal symptoms. [facility]. Denied pain, HI/SI. Staff will continue to monitor.    BP (!) 146/90 (BP Location: Right arm, Patient Position: Sitting, Cuff Size: Adult Regular)   Pulse 57   Temp 97.8  F (36.6  C) (Temporal)   Resp 16   Ht 1.753 m (5' 9\")   Wt 88.5 kg (195 lb)   SpO2 99%   BMI 28.80 kg/m              "

## 2025-06-09 LAB
ANION GAP BLD CALCULATED.3IONS-SCNC: 16 MMOL/L (ref 7–15)
BUN SERPL-MCNC: 5 MG/DL (ref 6–20)
CA-I BLD-MCNC: 4.6 MG/DL (ref 4.4–5.2)
CHLORIDE BLD-SCNC: 91 MMOL/L (ref 98–107)
CO2 BLD-SCNC: 25 MMOL/L
CREAT BLD-MCNC: 0.9 MG/DL (ref 0.7–1.2)
HCT VFR BLD CALC: 42 % (ref 40–53)
HGB BLD-MCNC: 14.3 G/DL (ref 13.3–17.7)
POTASSIUM BLD-SCNC: 4.1 MMOL/L (ref 3.4–5.3)
SODIUM BLD-SCNC: 127 MMOL/L (ref 135–145)

## 2025-06-14 ENCOUNTER — HEALTH MAINTENANCE LETTER (OUTPATIENT)
Age: 55
End: 2025-06-14